# Patient Record
Sex: FEMALE | Race: WHITE | NOT HISPANIC OR LATINO | Employment: FULL TIME | URBAN - METROPOLITAN AREA
[De-identification: names, ages, dates, MRNs, and addresses within clinical notes are randomized per-mention and may not be internally consistent; named-entity substitution may affect disease eponyms.]

---

## 2023-05-18 DIAGNOSIS — E78.2 COMBINED HYPERLIPIDEMIA: Primary | ICD-10-CM

## 2023-05-18 PROBLEM — L27.0 ALLERGIC DRUG RASH: Status: ACTIVE | Noted: 2023-05-18

## 2023-05-18 PROBLEM — B00.1 RECURRENT COLD SORES: Status: ACTIVE | Noted: 2023-05-18

## 2023-05-18 PROBLEM — J32.9 CHRONIC SINUSITIS: Status: ACTIVE | Noted: 2023-05-18

## 2023-05-18 PROBLEM — L50.9 HIVES: Status: ACTIVE | Noted: 2023-05-18

## 2023-05-18 PROBLEM — I07.1 MILD TRICUSPID INSUFFICIENCY: Status: ACTIVE | Noted: 2023-05-18

## 2023-05-18 PROBLEM — I35.1 MILD AORTIC INSUFFICIENCY: Status: ACTIVE | Noted: 2023-05-18

## 2023-05-18 PROBLEM — R60.9 DEPENDENT EDEMA: Status: ACTIVE | Noted: 2023-05-18

## 2023-05-18 PROBLEM — R31.9 HEMATURIA: Status: ACTIVE | Noted: 2023-05-18

## 2023-05-18 PROBLEM — I34.0 MILD MITRAL REGURGITATION: Status: ACTIVE | Noted: 2023-05-18

## 2023-05-18 PROBLEM — M81.0 POSTMENOPAUSAL BONE LOSS: Status: ACTIVE | Noted: 2023-05-18

## 2023-05-18 PROBLEM — I51.9 MILD DIASTOLIC DYSFUNCTION: Status: ACTIVE | Noted: 2023-05-18

## 2023-05-18 PROBLEM — R21 FACIAL RASH: Status: ACTIVE | Noted: 2023-05-18

## 2023-05-18 PROBLEM — R29.818 SUSPECTED SLEEP APNEA: Status: ACTIVE | Noted: 2023-05-18

## 2023-05-18 PROBLEM — Z90.79 S/P TAH-BSO: Status: ACTIVE | Noted: 2023-05-18

## 2023-05-18 PROBLEM — I10 BENIGN ESSENTIAL HYPERTENSION: Status: ACTIVE | Noted: 2023-05-18

## 2023-05-18 PROBLEM — I48.0 PAROXYSMAL ATRIAL FIBRILLATION (MULTI): Status: ACTIVE | Noted: 2023-05-18

## 2023-05-18 PROBLEM — B00.9 HERPES SIMPLEX TYPE 1 INFECTION: Status: ACTIVE | Noted: 2023-05-18

## 2023-05-18 PROBLEM — R39.9 SYMPTOMS INVOLVING URINARY SYSTEM: Status: ACTIVE | Noted: 2023-05-18

## 2023-05-18 PROBLEM — R06.83 LOUD SNORING: Status: ACTIVE | Noted: 2023-05-18

## 2023-05-18 PROBLEM — U07.1 COVID-19 VIRUS INFECTION: Status: ACTIVE | Noted: 2023-05-18

## 2023-05-18 PROBLEM — H93.19 RINGING IN EARS: Status: ACTIVE | Noted: 2023-05-18

## 2023-05-18 PROBLEM — Z12.11 COLON CANCER SCREENING: Status: ACTIVE | Noted: 2023-05-18

## 2023-05-18 PROBLEM — L98.9 ARM SKIN LESION, RIGHT: Status: ACTIVE | Noted: 2023-05-18

## 2023-05-18 PROBLEM — Z90.722 S/P TAH-BSO: Status: ACTIVE | Noted: 2023-05-18

## 2023-05-18 PROBLEM — I47.10 PAROXYSMAL SUPRAVENTRICULAR TACHYCARDIA (CMS-HCC): Status: ACTIVE | Noted: 2023-05-18

## 2023-05-18 PROBLEM — M77.8 SHOULDER CAPSULITIS: Status: ACTIVE | Noted: 2023-05-18

## 2023-05-18 PROBLEM — G50.0 TRIGEMINAL NEURALGIA: Status: ACTIVE | Noted: 2023-05-18

## 2023-05-18 PROBLEM — Z90.710 S/P TAH-BSO: Status: ACTIVE | Noted: 2023-05-18

## 2023-05-18 PROBLEM — H10.9 CONJUNCTIVITIS: Status: ACTIVE | Noted: 2023-05-18

## 2023-05-18 LAB
ALANINE AMINOTRANSFERASE (SGPT) (U/L) IN SER/PLAS: 23 U/L (ref 7–45)
ALBUMIN (G/DL) IN SER/PLAS: 4.4 G/DL (ref 3.4–5)
ALKALINE PHOSPHATASE (U/L) IN SER/PLAS: 123 U/L (ref 33–136)
ANION GAP IN SER/PLAS: 10 MMOL/L (ref 10–20)
ASPARTATE AMINOTRANSFERASE (SGOT) (U/L) IN SER/PLAS: 21 U/L (ref 9–39)
BILIRUBIN TOTAL (MG/DL) IN SER/PLAS: 0.4 MG/DL (ref 0–1.2)
CALCIUM (MG/DL) IN SER/PLAS: 9.3 MG/DL (ref 8.6–10.3)
CARBON DIOXIDE, TOTAL (MMOL/L) IN SER/PLAS: 28 MMOL/L (ref 21–32)
CHLORIDE (MMOL/L) IN SER/PLAS: 105 MMOL/L (ref 98–107)
CHOLESTEROL (MG/DL) IN SER/PLAS: 231 MG/DL (ref 0–199)
CHOLESTEROL IN HDL (MG/DL) IN SER/PLAS: 60.6 MG/DL
CHOLESTEROL/HDL RATIO: 3.8
CREATININE (MG/DL) IN SER/PLAS: 0.75 MG/DL (ref 0.5–1.05)
ERYTHROCYTE DISTRIBUTION WIDTH (RATIO) BY AUTOMATED COUNT: 14.5 % (ref 11.5–14.5)
ERYTHROCYTE MEAN CORPUSCULAR HEMOGLOBIN CONCENTRATION (G/DL) BY AUTOMATED: 32.3 G/DL (ref 32–36)
ERYTHROCYTE MEAN CORPUSCULAR VOLUME (FL) BY AUTOMATED COUNT: 88 FL (ref 80–100)
ERYTHROCYTES (10*6/UL) IN BLOOD BY AUTOMATED COUNT: 5 X10E12/L (ref 4–5.2)
GFR FEMALE: 88 ML/MIN/1.73M2
GLUCOSE (MG/DL) IN SER/PLAS: 84 MG/DL (ref 74–99)
HEMATOCRIT (%) IN BLOOD BY AUTOMATED COUNT: 43.9 % (ref 36–46)
HEMOGLOBIN (G/DL) IN BLOOD: 14.2 G/DL (ref 12–16)
LDL: 151 MG/DL (ref 0–99)
LEUKOCYTES (10*3/UL) IN BLOOD BY AUTOMATED COUNT: 6 X10E9/L (ref 4.4–11.3)
PLATELETS (10*3/UL) IN BLOOD AUTOMATED COUNT: 334 X10E9/L (ref 150–450)
POTASSIUM (MMOL/L) IN SER/PLAS: 4.3 MMOL/L (ref 3.5–5.3)
PROTEIN TOTAL: 7.4 G/DL (ref 6.4–8.2)
SODIUM (MMOL/L) IN SER/PLAS: 139 MMOL/L (ref 136–145)
THYROTROPIN (MIU/L) IN SER/PLAS BY DETECTION LIMIT <= 0.05 MIU/L: 2.14 MIU/L (ref 0.44–3.98)
TRIGLYCERIDE (MG/DL) IN SER/PLAS: 98 MG/DL (ref 0–149)
UREA NITROGEN (MG/DL) IN SER/PLAS: 11 MG/DL (ref 6–23)
VLDL: 20 MG/DL (ref 0–40)

## 2023-05-18 RX ORDER — EZETIMIBE 10 MG/1
10 TABLET ORAL DAILY
COMMUNITY
Start: 2023-04-18 | End: 2023-05-18 | Stop reason: SDUPTHER

## 2023-05-18 RX ORDER — DILTIAZEM HYDROCHLORIDE 240 MG/1
240 CAPSULE, EXTENDED RELEASE ORAL DAILY
COMMUNITY
End: 2023-09-28 | Stop reason: SDUPTHER

## 2023-05-18 RX ORDER — EZETIMIBE 10 MG/1
10 TABLET ORAL DAILY
Qty: 90 TABLET | Refills: 2 | Status: SHIPPED | OUTPATIENT
Start: 2023-05-18 | End: 2024-01-05 | Stop reason: SDUPTHER

## 2023-05-18 RX ORDER — APIXABAN 5 MG/1
1 TABLET, FILM COATED ORAL 2 TIMES DAILY
COMMUNITY
Start: 2021-03-02 | End: 2023-11-09 | Stop reason: SDUPTHER

## 2023-05-18 RX ORDER — LORATADINE 10 MG/1
10 TABLET ORAL DAILY
COMMUNITY

## 2023-05-18 RX ORDER — ERGOCALCIFEROL (VITAMIN D2) 50 MCG
2000 CAPSULE ORAL DAILY
COMMUNITY
End: 2023-11-09 | Stop reason: ALTCHOICE

## 2023-05-18 RX ORDER — LUTEIN 10 MG
1 TABLET ORAL DAILY
COMMUNITY
End: 2023-07-12 | Stop reason: ALTCHOICE

## 2023-05-18 RX ORDER — PROPAFENONE HYDROCHLORIDE 150 MG/1
150 TABLET, COATED ORAL 2 TIMES DAILY
COMMUNITY
End: 2023-11-09 | Stop reason: SDUPTHER

## 2023-07-11 NOTE — PROGRESS NOTES
Subjective   Reason for Visit: Luba Coates is an 67 y.o. female here for her Welcome to Medicare Assessment          She has an appt with Dr Buchanan 7/13/2023.  She saw Dr Grady in 2/2023. She thinks Dr Grady is fabulous.   She wishes she had been seeing Dr Grady since day 1.     She has had a terrible time getting the CPAP situated.  She stopped dealing with the CPAP. She has done 8 days with the CPAP.   She barely slept for 4 days when she first started the CPAP     She is moving her mother to assisted living. Her mother is 91 y/o.   She has to sell her mothers house. She went to her mothers house and there was water leaking.  They turned the water off and her  fixed it but the water would not come back on   Her mother has had multiple medical situations this year 2023.   Her mother's cognitive ability has decreased greatly with all her medical issues.      She admits she eats a lot of cheese.       HEALTH:   PAP fine 2013 and HPV - , 5/17 -, D/C 4/19 was + polyp -.. TRH/BSO in 6/19   Vaginal exam 9/15 , 5/17, 5/18   Mammo 7/17 -, 6/18, missed 2019, 7/2020, 2/2022, 2/2023  BD 6/18 T-1.7, 2/2023 T-2.5  Colon 7/17 and Q 10   Ca+ cardiac score 6/18 was Zero  EKG 2010, 9/15 , 4/17, 4/19, 2/2020, 10/2022, 7/2023  Urine 5/18,  Flu 10/2022  Hep C 6/18 -   TDAP 6/5/2020  Prevnar 20 recommended and will return   Pneumovax never  Shingrix 6/18/2020 and 9/9/2020   Pfizer CVD vaccine 3/4/2021 and 3/25/2021 booster 11/19/2021, 10/18/2022  Ophth Her last eye exam was in 1/2022 and sees yearly. She has early cataracts OD, no glaucoma or MD.      Patient Care Team:  Deepika Edgar MD as PCP - General     Review of Systems  All systems negative except those listed in the HPI      Past Medical, Surgical, and Family History reviewed and updated in chart.  Reviewed all medications by prescribing practitioner or clinical pharmacist   (such as prescriptions, OTCs, herbal therapies and supplements) and documented in the  medical record      Objective   Vitals:  Visit Vitals  /70   Pulse 77   Temp 36.1 °C (97 °F) (Skin)    Body mass index is 33.78 kg/m².      Physical Exam  Vitals reviewed.   Constitutional:       Appearance: Normal appearance. She is obese.   HENT:      Head: Normocephalic.      Right Ear: Tympanic membrane, ear canal and external ear normal.      Left Ear: Tympanic membrane, ear canal and external ear normal.      Nose: Nose normal.      Mouth/Throat:      Pharynx: Oropharynx is clear.   Eyes:      Conjunctiva/sclera: Conjunctivae normal.   Cardiovascular:      Rate and Rhythm: Normal rate and regular rhythm.      Pulses: Normal pulses.      Heart sounds: Normal heart sounds.      Comments: she is not in a- fib   Pulmonary:      Effort: Pulmonary effort is normal.      Breath sounds: Normal breath sounds.   Abdominal:      General: Bowel sounds are normal.      Palpations: Abdomen is soft.   Musculoskeletal:         General: Normal range of motion.      Cervical back: Normal range of motion and neck supple.   Skin:     General: Skin is warm.   Neurological:      General: No focal deficit present.      Mental Status: She is alert and oriented to person, place, and time.   Psychiatric:         Mood and Affect: Mood normal.         Behavior: Behavior normal.         Thought Content: Thought content normal.         Judgment: Judgment normal.       Assessment/Plan   Problem List Items Addressed This Visit    None    Welcome to Medicare Assessment completed  Reviewed her labs from 5/2023     Medicare Wellness completed  -  Discussed healthy diet and regular exercise.    -  Physical exam overall unremarkable. Immunizations reviewed and updated accordingly. Healthy lifestyle choices discussed (tobacco avoidance, appropriate alcohol use, avoidance of illicit substances).   -  Patient is wearing seatbelt.   -  Screening lab work ordered as indicated.    -  Age appropriate screening tests reviewed with patient.       We  spent 15 minutes discussing depression screen and there is nothing found that is of concern for underling depression. The PQH form was filled and the meds reviewed.   No depression to report     We spent 15 minutes discussing alcohol use and there are no concerns about overuse. The 15 min was spent in going over any issues of use of alcohol. Socially only     She has grab bars in the shower.  She has not fallen recently and no risk of falls in the house   She has good lighting around the house and functioning smoke detectors.      Her weight in office last visit was 203 with BMI of 33.78. We spent 15 minutes discussing diet and weight loss. The struggle of weight loss persists   Discussed importance of getting her BMI as close to 25 as possible.   She needs to focus on her diet to help control her lipid levels.     A-fib diagnosed 2/5/2020: she is not in a- fib 7/2023. She has intermittent episodes of a- fib. EtOH exacerbates her a- fib. Warned patient of a- fib with EtOH use.   She woke up sick on 12/30/2022, she had a-fib for 5 hours.   She does not like the fact that the a- fib is this prominent.   Continue propafenone 150 mg BID prn for breakthrough palpitations   Continue Eliquis 5 mg BID, HCTZ 12.5 mg daily and diltiazem 240 mg daily   Stress test, ECHO, and testing was normal no valve disease or clot 2/2020.   Holter monitor in 2/2020, she was in normal sinus rhythm, there was no atrial fibrillation. No significant tachycardia.   She saw Dr Roberts in 12/2021   She saw Dr Grady in 2/2023.   She has an appt with Dr Buchanan 7/13/2023.    HTN : Stable   She was experiencing heart palpitations due to increased stress   Bystolic helped but she had diarrhea with it so we weaned her off.  Palpitations were related to alcohol intake as well  EKG was normal 7/2023, no LVH or strain pattern noted   Continue diltiazem 240 mg daily and propafenone 150 mg BID. She takes an extra propafenone if she is in a-fib  Recommend  she monitor her BP at home and call with elevated readings   She saw Dr Becker in 10/2022    I have spent 15 min face to face with this patient discussing their cardiac risk and behavioral therapies of nutrition choices and exercise. We are trying to eliminate habits that are contributing to their cardiac risk.  We agreed on a plan of how they can reduce their current CV risk   The patient's  10 yr CV risk was estimated at  6.6 % 7/2023     HLD: Uncontrolled.  and HDL 60 on labs in 5/2023. She has to commit to diet and exercise. Offered patient referral to dietician, she declines stating she knows what she needs to do.   Explained goal for LDL to be less than 100 and ideally less than 70    She admits she eats a lot of cheese. Recommend she limit dairy intake   Continue Zetia 20 mg daily   Discontinued simvastatin due to LE weakness   Discontinued Crestor due to aching and nightmares  She stopped pravastatin due to developing a rash.  She failed Livalo due to cost     Ca cardiac score in 10/2021 showed score of ZERO, unchanged 1.6 cm low density left hepatic lobe lesion may represent a cyst but suboptimally evaluated without contrast   She needs to be committed to diet and exercise     BLE :  Stable.   She is taking a diuretic prn   Recommend she begin wearing support stockings daily   Elevate legs 45 minutes at night     BPPV: Stable   When her ears get congested she has vertigo Sx   Continue Epley maneuver prn     Mild situational depression: She is moving her mother to assisted living. She has to sell her mothers house. Her mother is 91 y/o. Her mother has had multiple medical situations this year 2023. Her mother's cognitive ability has decreased greatly with all her medical issues.   Recommend she speak with a therapist. She declines.   She has no family around to help her with her mother.   She has increased stressors with her mother since she is taking care of her.  She has a friend that relies on her  quite a bit    She takes care of her  with his health issues with Myasthenia Gravis.  She is mentally and physically exhausted from taking care of others  Her father passed away in 11/18.    Explained importance of her taking breaks for herself to help her cope   She failed Lexapro due to nausea and fatigue and weaned off.  She declines medication     Intermittent left knee pain: she has no complaints of this today 7/2023   She had improvement in this condition with the chiropractor   She will do the tennis ball stretches. She will keep up the exercise   She is flat footed and needs to wear orthotics in her shoes  She is not to walk bare footed     She saw dermatology in 2020 and no issues to report     Cold sore inner left lip due to increased stress and history.  She has Valtrex 1GM on hand for outbreaks   She can use OTC Abreva.    GAVIOTA: She has had a terrible time getting the CPAP situated. She stopped dealing with the CPAP. She has done 8 days with the CPAP.  Recommend she talk with her dentist about an appliance she could use instead of the CPAP.   She is not interested in Inspire.   Symptoms include snoring, night time awakenings, nocturia.   PSG 4/12/23 --> mild GAVIOTA, RDI 3%13.4, RDI 4% 8, OLVIN 0; SpO2 karlee 86%.   She saw Dr Stiles in 6/2023 and using CPAP      Vitamin D def:  Continue OTC Vitamin D 2000 UT daily     She had GARCIA and BSO with Dr Killian in 6/19. Bx was precancerous.   She does not need any HRT.   Bladder has mild anterior prolapse and continue Kegels   Mammo was normal in 2/2023. Breast exam normal 7/2023     BD in 2/2023 T-2.5. Her mother has OP and started having fractures in her late 50's Her mother is doing Prolia injections.   Discussed systemic medications to treat OP along with possible side effects.   Recommend she see her dentist to make sure she does not need any work done prior to beginning OP medications. She saw her dentist in 4/2023. She will discuss beginning Fosamax with  her dentist   Recommend OTC Calcium 500 mg BID, OTC Vitamin D 2000 UT daily and eat 2 servings of calcium enriched foods daily. Discussed importance of weight bearing exercises     Colonoscopy in 7/17 and normal and Q 10     Ophth:  Her last eye exam was in 1/2022 and sees yearly.   She has early cataracts OD, no glaucoma or MD.   She will have her next eye exam faxed to my office in order to update her medical records.    I spent 15 min with the patient discussing their wishes for end of life choices.   We discussed the need for a Living Will and that wishes should be discussed with Family. The DNR status was reviewed, and we discussed the options of this and, the DNR _CC options as well.   We also went over how important it was to have these choices written down and clear for any surviving family so that their wishes are followed   The patient and I came to to following agreement : She has a LW and MPOA.  Recommend she bring a copy of her LW and MPOA in office to have scanned in her chart 7/2023     Flu 10/18/2022  Hep C 6/18 -   TDAP 6/5/2020  Prevnar 20 recommended and will return    Pneumovax never  Shingrix 6/18/2020 and 9/9/2020   Pfizer CVD vaccine 3/4/2021 and 3/25/2021 booster 11/19/2021, 10/18/2022    Some elements in the chart were copied from Dr. Edgar's last office visit with patient. Notes have been updated where appropriate, and reflect my current medical decision making from today.     RTC in 6 months for follow up or sooner if needed - will do Prevnar 20 and influenza vaccine     Scribe Attestation  By signing my name below, I, Hyun Friedman , Scribe   attest that this documentation has been prepared under the direction and in the presence of Deepika Edgar MD.

## 2023-07-12 ENCOUNTER — OFFICE VISIT (OUTPATIENT)
Dept: PRIMARY CARE | Facility: CLINIC | Age: 68
End: 2023-07-12
Payer: MEDICARE

## 2023-07-12 VITALS
OXYGEN SATURATION: 97 % | SYSTOLIC BLOOD PRESSURE: 118 MMHG | TEMPERATURE: 97 F | WEIGHT: 203 LBS | HEIGHT: 65 IN | DIASTOLIC BLOOD PRESSURE: 70 MMHG | HEART RATE: 77 BPM | BODY MASS INDEX: 33.82 KG/M2

## 2023-07-12 DIAGNOSIS — M81.0 AGE-RELATED OSTEOPOROSIS WITHOUT CURRENT PATHOLOGICAL FRACTURE: ICD-10-CM

## 2023-07-12 DIAGNOSIS — G50.0 TRIGEMINAL NEURALGIA: ICD-10-CM

## 2023-07-12 DIAGNOSIS — I48.0 PAROXYSMAL ATRIAL FIBRILLATION (MULTI): ICD-10-CM

## 2023-07-12 DIAGNOSIS — I10 BENIGN ESSENTIAL HYPERTENSION: ICD-10-CM

## 2023-07-12 DIAGNOSIS — I35.1 MILD AORTIC INSUFFICIENCY: ICD-10-CM

## 2023-07-12 DIAGNOSIS — E78.2 COMBINED HYPERLIPIDEMIA: ICD-10-CM

## 2023-07-12 DIAGNOSIS — Z00.00 HEALTHCARE MAINTENANCE: Primary | ICD-10-CM

## 2023-07-12 DIAGNOSIS — G47.33 OBSTRUCTIVE SLEEP APNEA SYNDROME: ICD-10-CM

## 2023-07-12 PROBLEM — E78.5 DYSLIPIDEMIA: Status: ACTIVE | Noted: 2023-07-12

## 2023-07-12 PROBLEM — G47.30 APNEA, SLEEP: Status: ACTIVE | Noted: 2023-07-12

## 2023-07-12 PROBLEM — R21 FACIAL RASH: Status: RESOLVED | Noted: 2023-05-18 | Resolved: 2023-07-12

## 2023-07-12 PROBLEM — R29.818 SUSPECTED SLEEP APNEA: Status: RESOLVED | Noted: 2023-05-18 | Resolved: 2023-07-12

## 2023-07-12 PROBLEM — H10.9 CONJUNCTIVITIS: Status: RESOLVED | Noted: 2023-05-18 | Resolved: 2023-07-12

## 2023-07-12 PROBLEM — I51.89 DIASTOLIC DYSFUNCTION: Status: ACTIVE | Noted: 2023-07-12

## 2023-07-12 PROBLEM — R39.9 SYMPTOMS INVOLVING URINARY SYSTEM: Status: RESOLVED | Noted: 2023-05-18 | Resolved: 2023-07-12

## 2023-07-12 PROBLEM — G47.30 SLEEP DISORDER BREATHING: Status: ACTIVE | Noted: 2023-07-12

## 2023-07-12 PROBLEM — L27.0 ALLERGIC DRUG RASH: Status: RESOLVED | Noted: 2023-05-18 | Resolved: 2023-07-12

## 2023-07-12 PROBLEM — G47.20 DISTURBED SLEEP RHYTHM: Status: ACTIVE | Noted: 2023-07-12

## 2023-07-12 PROBLEM — E78.5 DYSLIPIDEMIA: Status: RESOLVED | Noted: 2023-07-12 | Resolved: 2023-07-12

## 2023-07-12 PROCEDURE — 1036F TOBACCO NON-USER: CPT | Performed by: INTERNAL MEDICINE

## 2023-07-12 PROCEDURE — 1126F AMNT PAIN NOTED NONE PRSNT: CPT | Performed by: INTERNAL MEDICINE

## 2023-07-12 PROCEDURE — 1159F MED LIST DOCD IN RCRD: CPT | Performed by: INTERNAL MEDICINE

## 2023-07-12 PROCEDURE — 1170F FXNL STATUS ASSESSED: CPT | Performed by: INTERNAL MEDICINE

## 2023-07-12 PROCEDURE — 3078F DIAST BP <80 MM HG: CPT | Performed by: INTERNAL MEDICINE

## 2023-07-12 PROCEDURE — 3008F BODY MASS INDEX DOCD: CPT | Performed by: INTERNAL MEDICINE

## 2023-07-12 PROCEDURE — 3074F SYST BP LT 130 MM HG: CPT | Performed by: INTERNAL MEDICINE

## 2023-07-12 PROCEDURE — G0402 INITIAL PREVENTIVE EXAM: HCPCS | Performed by: INTERNAL MEDICINE

## 2023-07-12 PROCEDURE — G0446 INTENS BEHAVE THER CARDIO DX: HCPCS | Performed by: INTERNAL MEDICINE

## 2023-07-12 PROCEDURE — G0403 EKG FOR INITIAL PREVENT EXAM: HCPCS | Performed by: INTERNAL MEDICINE

## 2023-07-12 PROCEDURE — 1160F RVW MEDS BY RX/DR IN RCRD: CPT | Performed by: INTERNAL MEDICINE

## 2023-07-12 RX ORDER — HYDROXYZINE HYDROCHLORIDE 25 MG/1
25 TABLET, FILM COATED ORAL NIGHTLY
COMMUNITY
Start: 2023-06-28 | End: 2023-11-09 | Stop reason: ALTCHOICE

## 2023-07-12 RX ORDER — CLOBETASOL PROPIONATE 0.5 MG/G
1 CREAM TOPICAL 2 TIMES DAILY
COMMUNITY
Start: 2023-06-28 | End: 2023-11-09 | Stop reason: ALTCHOICE

## 2023-07-12 RX ORDER — ALENDRONATE SODIUM 70 MG/1
70 TABLET ORAL
Qty: 12 TABLET | Refills: 3 | Status: SHIPPED | OUTPATIENT
Start: 2023-07-12 | End: 2024-01-05 | Stop reason: SDUPTHER

## 2023-07-12 ASSESSMENT — PATIENT HEALTH QUESTIONNAIRE - PHQ9
1. LITTLE INTEREST OR PLEASURE IN DOING THINGS: NOT AT ALL
SUM OF ALL RESPONSES TO PHQ9 QUESTIONS 1 AND 2: 0
2. FEELING DOWN, DEPRESSED OR HOPELESS: NOT AT ALL

## 2023-07-12 ASSESSMENT — VISUAL ACUITY
OS_CC: 20/25
OD_CC: 20/20

## 2023-07-12 ASSESSMENT — ENCOUNTER SYMPTOMS
DEPRESSION: 0
LOSS OF SENSATION IN FEET: 0
OCCASIONAL FEELINGS OF UNSTEADINESS: 0

## 2023-07-12 ASSESSMENT — ACTIVITIES OF DAILY LIVING (ADL)
DOING_HOUSEWORK: INDEPENDENT
BATHING: INDEPENDENT
GROCERY_SHOPPING: INDEPENDENT
DRESSING: INDEPENDENT
TAKING_MEDICATION: INDEPENDENT
MANAGING_FINANCES: INDEPENDENT

## 2023-09-28 DIAGNOSIS — I10 BENIGN ESSENTIAL HYPERTENSION: Primary | ICD-10-CM

## 2023-09-28 RX ORDER — DILTIAZEM HYDROCHLORIDE 240 MG/1
240 CAPSULE, EXTENDED RELEASE ORAL DAILY
Qty: 30 CAPSULE | Refills: 0 | Status: SHIPPED | OUTPATIENT
Start: 2023-09-28 | End: 2023-10-20 | Stop reason: SDUPTHER

## 2023-10-20 DIAGNOSIS — I10 BENIGN ESSENTIAL HYPERTENSION: ICD-10-CM

## 2023-10-20 RX ORDER — DILTIAZEM HYDROCHLORIDE 240 MG/1
240 CAPSULE, EXTENDED RELEASE ORAL DAILY
Qty: 90 CAPSULE | Refills: 1 | Status: SHIPPED | OUTPATIENT
Start: 2023-10-20 | End: 2023-11-01 | Stop reason: SDUPTHER

## 2023-11-01 DIAGNOSIS — I10 BENIGN ESSENTIAL HYPERTENSION: ICD-10-CM

## 2023-11-01 RX ORDER — DILTIAZEM HYDROCHLORIDE 240 MG/1
240 CAPSULE, EXTENDED RELEASE ORAL DAILY
Qty: 30 CAPSULE | Refills: 11 | Status: SHIPPED | OUTPATIENT
Start: 2023-11-01 | End: 2023-11-09 | Stop reason: SDUPTHER

## 2023-11-08 PROBLEM — I51.89 DIASTOLIC DYSFUNCTION: Status: RESOLVED | Noted: 2023-07-12 | Resolved: 2023-11-08

## 2023-11-08 RX ORDER — PROPAFENONE HYDROCHLORIDE 225 MG/1
1 TABLET, COATED ORAL 2 TIMES DAILY
COMMUNITY
Start: 2023-07-13 | End: 2023-11-09 | Stop reason: SDUPTHER

## 2023-11-09 ENCOUNTER — OFFICE VISIT (OUTPATIENT)
Dept: CARDIOLOGY | Facility: CLINIC | Age: 68
End: 2023-11-09
Payer: MEDICARE

## 2023-11-09 VITALS
WEIGHT: 209 LBS | SYSTOLIC BLOOD PRESSURE: 140 MMHG | HEIGHT: 65 IN | BODY MASS INDEX: 34.82 KG/M2 | OXYGEN SATURATION: 97 % | DIASTOLIC BLOOD PRESSURE: 78 MMHG | HEART RATE: 73 BPM

## 2023-11-09 DIAGNOSIS — I10 BENIGN ESSENTIAL HYPERTENSION: ICD-10-CM

## 2023-11-09 DIAGNOSIS — I48.0 PAROXYSMAL ATRIAL FIBRILLATION (MULTI): Primary | ICD-10-CM

## 2023-11-09 PROCEDURE — 93000 ELECTROCARDIOGRAM COMPLETE: CPT | Performed by: STUDENT IN AN ORGANIZED HEALTH CARE EDUCATION/TRAINING PROGRAM

## 2023-11-09 PROCEDURE — 3008F BODY MASS INDEX DOCD: CPT | Performed by: STUDENT IN AN ORGANIZED HEALTH CARE EDUCATION/TRAINING PROGRAM

## 2023-11-09 PROCEDURE — 1036F TOBACCO NON-USER: CPT | Performed by: STUDENT IN AN ORGANIZED HEALTH CARE EDUCATION/TRAINING PROGRAM

## 2023-11-09 PROCEDURE — 1159F MED LIST DOCD IN RCRD: CPT | Performed by: STUDENT IN AN ORGANIZED HEALTH CARE EDUCATION/TRAINING PROGRAM

## 2023-11-09 PROCEDURE — 3077F SYST BP >= 140 MM HG: CPT | Performed by: STUDENT IN AN ORGANIZED HEALTH CARE EDUCATION/TRAINING PROGRAM

## 2023-11-09 PROCEDURE — 1160F RVW MEDS BY RX/DR IN RCRD: CPT | Performed by: STUDENT IN AN ORGANIZED HEALTH CARE EDUCATION/TRAINING PROGRAM

## 2023-11-09 PROCEDURE — 99214 OFFICE O/P EST MOD 30 MIN: CPT | Performed by: STUDENT IN AN ORGANIZED HEALTH CARE EDUCATION/TRAINING PROGRAM

## 2023-11-09 PROCEDURE — 3078F DIAST BP <80 MM HG: CPT | Performed by: STUDENT IN AN ORGANIZED HEALTH CARE EDUCATION/TRAINING PROGRAM

## 2023-11-09 PROCEDURE — 1126F AMNT PAIN NOTED NONE PRSNT: CPT | Performed by: STUDENT IN AN ORGANIZED HEALTH CARE EDUCATION/TRAINING PROGRAM

## 2023-11-09 RX ORDER — PROPAFENONE HYDROCHLORIDE 225 MG/1
225 TABLET, COATED ORAL 2 TIMES DAILY
Qty: 60 TABLET | Refills: 11 | Status: SHIPPED | OUTPATIENT
Start: 2023-11-09 | End: 2024-01-05 | Stop reason: SDUPTHER

## 2023-11-09 RX ORDER — APIXABAN 5 MG/1
5 TABLET, FILM COATED ORAL 2 TIMES DAILY
Qty: 60 TABLET | Refills: 11 | Status: SHIPPED | OUTPATIENT
Start: 2023-11-09 | End: 2024-01-05 | Stop reason: SDUPTHER

## 2023-11-09 RX ORDER — DILTIAZEM HYDROCHLORIDE 240 MG/1
240 CAPSULE, EXTENDED RELEASE ORAL DAILY
Qty: 30 CAPSULE | Refills: 11 | Status: SHIPPED | OUTPATIENT
Start: 2023-11-09 | End: 2024-01-05 | Stop reason: SDUPTHER

## 2023-11-09 NOTE — PROGRESS NOTES
"Cardiac Electrophysiology Office Visit    Referred by Kiko Lugo MD for   Chief Complaint   Patient presents with    Atrial Fibrillation        HPI:  Luba Coates is a 67 y.o. year old female patient with h/o paroxysmal atrial fibrillation (on Eliquis and propafenone, diltiazem), HLD (multiple statins tried but not able to tolerate due to myalgias) presenting today for follow-up    Pt did increase her Propafenone. She has some contact dermatitis and was on antihistamine. Most recent more episodes of AF. Aug 8th AF lasting 2 hours.    PMHx/PSHx: As above  Tobacco never, alcohol - social, caffeine use 1 cup/day, drug use - denies  FamHx: Father - AF in 70s, Mother - AF in her 90s,     Objective  Allergies   Allergen Reactions    Nebivolol Other     Leg cramps    Statins-Hmg-Coa Reductase Inhibitors Myalgia    Tramadol Anxiety, Psychosis and Nausea/vomiting     hallucinations    Escitalopram Oxalate Nausea/vomiting        Current Outpatient Medications   Medication Instructions    alendronate (FOSAMAX) 70 mg, oral, Every 7 days, Take in the morning with a full glass of water, on an empty stomach, and do not take anything else by mouth or lie down for the next 30 min.    Eliquis 5 mg, oral, 2 times daily    ezetimibe (ZETIA) 10 mg, oral, Daily    loratadine (CLARITIN) 10 mg, oral, Daily    propafenone (RYTHMOL) 225 mg, oral, 2 times daily    Tiadylt  mg, oral, Daily         Visit Vitals  /78 (BP Location: Left arm, Patient Position: Sitting)   Pulse 73   Ht 1.651 m (5' 5\")   Wt 94.8 kg (209 lb)   SpO2 97%   BMI 34.78 kg/m²   Smoking Status Never   BSA 2.09 m²        Physical Exam  Vitals reviewed.   Constitutional:       Appearance: Normal appearance.   HENT:      Head: Normocephalic.   Cardiovascular:      Rate and Rhythm: Normal rate and regular rhythm.   Pulmonary:      Effort: Pulmonary effort is normal. No respiratory distress.      Breath sounds: No wheezing.   Skin:     General: Skin is " warm and dry.      Capillary Refill: Capillary refill takes less than 2 seconds.   Neurological:      Mental Status: She is alert.   Psychiatric:         Mood and Affect: Mood normal.        My Interpretation of Reviewed Study(s):  Coronary calcium score 10/7/2021: Total score of 0. Ascending aorta normal dimension.  Echo (Feb 2020): EF 50 to 55%. Mild concentric LVH. Grade 2 diastolic dysfunction. Mild AR, MR, and TR. RVSP 34 mmHg.  Stress echo (Feb 2020): EF at rest 55% increasing to 65% postexercise. No evidence of ischemia.  6-day cardiac monitor (Feb 2020): Sinus rhythm with PACs and PVCs along with 3 episodes of atrial tachycardia with the longest and fastest episode of 4 beats at 151 bpm.     Assessment/Plan   #Paroxysmal atrial fibrillation  -AF Dx History: 72 2020 with symptoms of heart racing  -h/o Cardioversion: No  -AAD Use:  Propafenone 150 mg twice daily  -Anticoagulation use: Apixaban 5mg BID (current)  - h/o Ablation: None  -[id number]: 3  -c/w AC: Apixaban 5mg BID  -We had a very detailed discussion about rhythm control. Options of alternative antiarrhythmics are reasonable versus ablative strategy. Patient initially suggested that she would like to wait until she has A-fib more frequently before considering ablation and I counseled that with increasing A-fib frequency it is likely that she will have more A-fib and therefore the efficacy of medications and procedures will go down. Patient is very adverse to an invasive procedure and therefore would like to pursue medication at this time. We discussed our options including increasing dose of propafenone, sotalol/Tikosyn.   -Given patient's relatively young age I am very hesitant and do not recommend amiodarone. We did discuss the side effects and concerns with sotalol and Tikosyn and terms of antiarrhythmic and proarrhythmic effects and therefore patient would like to try an increased dose of propafenone first.  - c/w Propafenone to 225mg BID,  and  Diltiazem ER 240mg Daily  -Although based on patient's symptoms there is seems to be an increasing A-fib episode frequency patient still is very hesitant about proceeding with an ablative strategy or advancing to alternative antiarrhythmic and therefore we will continue with current medication regimen    Return to Clinic: Patient should return to the EP Clinic in 3 months     Kiko Buchanan MD [facility]  Cardiac Electrophysiology  [email or web address]    **Disclaimer: This note was dictated by speech recognition, and every effort has been made to prevent any error in transcription, however minor errors may be present**

## 2023-11-13 NOTE — PROGRESS NOTES
Subjective   Patient ID: Luba Coates is a 67 y.o. female who presents for her 4 month follow up multiple medical conditions       She was having difficulty adjusting to the CPAP so she turned it back in and will retry in 2/2024  She states she was never fitted for the CPAP.   She admits she was dealing with her mom getting her into assisted living and she was not sleeping  The cardiologist was not concerned she was not wearing the CPAP.    She was out with friends in 9/2023 and was around insects  She received a few bug bites so she started using creams and taking antihistamine   She took Medrol Dose Daniel due to not healing   She was in a- fib the entire time she was on vacation   She went back on Cardizem and since being on the 3 medications she has had 2 episodes of a- fib  She has seen Dr Buchanan and she told him the above situation   The rash has resolved and she is using moisturizing creams to keep it at bay    She started Fosamax after getting clearance from the dentist. She has been on Fosamax for 3 weeks    She would like the influenza and Prevnar 20 vaccine while in office today     She has been moving more and she feels better     She moved her mother to assisted living. Her mother is 89 y/o.   Her mother likes the new place and the patient sold her mothers house     She stopped taking OTC calcium due to having constipation        HEALTH:   PAP fine 2013 and HPV - , 5/17 -, D/C 4/19 + polyp -.. TRH/BSO in 6/19   Mammo 7/17 -, 6/18, 7/2020, 2/2022, 2/2023  BD 6/18 T-1.7, 2/2023 T-2.5 and started fosamax in 10/2023  Colon 7/17 and Q 10   Ca+ cardiac score 6/18 was Zero  EKG 2010, 9/15 , 4/17, 4/19, 2/2020, 10/2022, 7/2023, 11/2023 cardio  Urine 5/18,  Hep C 6/18 -    Flu 10/2022, 11/2023   TDAP 6/5/2020  Arexvy will hold off this year 2023  Prevnar 20 11/14/2023  Pneumovax never  Shingrix 6/18/2020 and 9/9/2020   evOLED CVD vaccine 3/4/2021 and 3/25/2021 booster 11/19/2021, 10/18/2022  Ophth Her last eye  exam was in 1/2022. She has early cataracts OD, no glaucoma or MD.       Review of Systems  All systems negative except those listed in the HPI      Objective   Visit Vitals  /70 (BP Location: Left arm, Patient Position: Sitting)   Pulse 69   Temp 36.1 °C (97 °F)    Body mass index is 33.95 kg/m².     Physical Exam  Vitals reviewed.   Constitutional:       Appearance: Normal appearance. She is obese.   HENT:      Head: Normocephalic.      Right Ear: Tympanic membrane, ear canal and external ear normal.      Left Ear: Tympanic membrane, ear canal and external ear normal.      Nose: Nose normal.      Mouth/Throat:      Pharynx: Oropharynx is clear.   Eyes:      Conjunctiva/sclera: Conjunctivae normal.   Cardiovascular:      Rate and Rhythm: Normal rate and regular rhythm.      Pulses: Normal pulses.      Heart sounds: Normal heart sounds.      Comments: sinus  Pulmonary:      Effort: Pulmonary effort is normal.      Breath sounds: Normal breath sounds.   Abdominal:      General: Bowel sounds are normal.      Palpations: Abdomen is soft.   Musculoskeletal:         General: Normal range of motion.      Cervical back: Normal range of motion and neck supple.   Skin:     General: Skin is warm.   Neurological:      General: No focal deficit present.      Mental Status: She is alert and oriented to person, place, and time.   Psychiatric:         Mood and Affect: Mood normal.         Behavior: Behavior normal.         Thought Content: Thought content normal.         Judgment: Judgment normal.       Assessment/Plan   Problem List Items Addressed This Visit       Benign essential hypertension - Primary    Combined hyperlipidemia    Dependent edema    GAVIOTA (obstructive sleep apnea)    Paroxysmal atrial fibrillation (CMS/HCC)    Paroxysmal supraventricular tachycardia    Postmenopausal bone loss    Trigeminal neuralgia       Follow up completed  Reviewed her labs from 5/2023    Her weight in office last visit was 204 with  BMI of 33.95. We spent 15 minutes discussing diet and weight loss. The struggle of weight loss persists   Discussed importance of getting her BMI as close to 25 as possible.   She needs to focus on her diet to help control her lipid levels.   She has been moving more and she feels better      A-fib diagnosed 2/5/2020: she is not in a- fib 11/2023. She records every episode and tries to identify the triggers   She woke up sick on 12/30/2022, she had a-fib for 5 hours.   She does not like the fact that the a- fib is this prominent.   Continue propafenone 225 mg BID prn for breakthrough palpitations   Continue Eliquis 5 mg BID, HCTZ 12.5 mg daily and diltiazem 240 mg daily   Stress test, ECHO, and testing was normal no valve disease or clot 2/2020.   Holter monitor in 2/2020, she was in normal sinus rhythm, there was no atrial fibrillation. No significant tachycardia.   She saw Dr Roberts in 12/2021   She saw Dr Grady in 2/2023.   She saw Dr Buchanan in 11/2023 and will see her back in 3 months      HTN : Stable   She was experiencing heart palpitations due to increased stress   Bystolic helped but she had diarrhea with it so we weaned her off.  Palpitations were related to alcohol intake as well  EKG sinus rhythm, HR 70 bpm 11/2023, no LVH or strain pattern noted   Continue diltiazem 240 mg daily and propafenone 225 mg BID.   She takes an extra propafenone if she is in a-fib  Recommend she monitor her BP at home and call with elevated readings   She saw Dr Buchanan in 11/2023 and will see her back in 3 months      I have spent 15 min face to face with this patient discussing their cardiac risk and behavioral therapies of nutrition choices and exercise. We are trying to eliminate habits that are contributing to their cardiac risk.  We agreed on a plan of how they can reduce their current CV risk   The patient's  10 yr CV risk was estimated at  6.6 % 11/2023      HLD: Uncontrolled.  and HDL 60 on labs in 5/2023.    Explained goal for LDL to be less than 100 and ideally less than 70    She admits she eats a lot of cheese. Recommend she limit dairy intake   Continue Zetia 20 mg daily   Discontinued simvastatin due to LE weakness   Discontinued Crestor due to aching and nightmares  She stopped pravastatin due to developing a rash.  She failed Livalo due to cost     Ca cardiac score in 10/2021 showed score of ZERO, unchanged 1.6 cm low density left hepatic lobe lesion may represent a cyst but suboptimally evaluated without contrast   She has to commit to diet and exercise. Offered patient referral to dietician, she declines stating she knows what she needs to do.     BPPV: Stable   When her ears get congested she has vertigo Sx   Continue Epley maneuver prn      BLE :  Stable.   She is taking a diuretic prn   Recommend she begin wearing support stockings daily   Elevate legs 45 minutes at night      Mild situational depression: She moved her mother to assisted living. Her mother is 89 y/o. Her mother likes the new place and the patient sold her mothers house   Her mother has had multiple medical situations this year 2023.   Her mother's cognitive ability has decreased greatly with all her medical issues.   She has no family around to help her with her mother.   She has a friend that relies on her quite a bit    She takes care of her  with his health issues with Myasthenia Gravis.  She is mentally and physically exhausted from taking care of others  Her father passed away in 11/18.    Recommend she speak with a therapist. She declines.   Explained importance of her taking breaks for herself to help her cope   She failed Lexapro due to nausea and fatigue and weaned off.  She declines medication      Intermittent left knee pain:   She had improvement in this condition with the chiropractor   She will do the tennis ball stretches. She will keep up the exercise   She is flat footed and needs to wear orthotics in her shoes  She  is not to walk bare footed      She saw dermatology in 2020 and no issues to report      Cold sore inner left lip due to increased stress and history.  She has Valtrex 1GM on hand for outbreaks   She can use OTC Abreva.     GAVIOTA: She was having difficulty adjusting to the CPAP so she turned it back in and will retry in 2/2024. The cardiologist was not concerned she was not wearing the CPAP.  She is not interested in Inspire.   Symptoms include snoring, night time awakenings, nocturia.   PSG 4/12/23 --> mild GAVIOTA, RDI 3%13.4, RDI 4% 8, OLVIN 0; SpO2 karlee 86%.   She saw Dr Stiles in 8/2023 and continue using CPAP nightly      Vitamin D def:  Continue OTC Vitamin D 2000 UT daily      She had GARCIA and BSO with Dr Killian in 6/19. Bx was precancerous.   She does not need any HRT.   Bladder has mild anterior prolapse and continue Kegels   Mammo was normal in 2/2023.   Breast exam normal 7/2023      BD in 2/2023 T-2.5. Her mother has OP and started having fractures in her late 50's Her mother is doing Prolia injections. Continue Fosamax 70 mg weekly.   She started Fosamax after getting clearance from the dentist.   She has been on Fosamax since 10/2023.   Continue OTC Calcium 600 mg QD (she had constipation with the higher doses of calcium), OTC Vitamin D 2000 UT daily and eat 2 servings of calcium enriched foods daily. Recommend adding a stool softener to help with constipation she gets from the calcium supplement. Recommend she add Mg glycinate 400 mg nightly   Discussed importance of weight bearing exercises      Colonoscopy in 7/17 and normal and Q 10      Ophth:  Her last eye exam was in 1/2022 and sees yearly.   She has early cataracts OD, no glaucoma or MD.   She will have her next eye exam faxed to my office in order to update her medical records.     She has a LW and MPOA.  Recommend she bring a copy of her LW and MPOA in office to have scanned in her chart 7/2023      Hep C 6/18 -    Flu 10/2022, 11/2023   TDAP  6/5/2020  Arexvy will hold off this year 2023   Prevnar 20 11/14/2023  Pneumovax never  Shingrix 6/18/2020 and 9/9/2020   Pfizer CVD vaccine 3/4/2021 and 3/25/2021 booster 11/19/2021, 10/18/2022     Some elements in the chart were copied from Dr. Edgar's last office visit with patient. Notes have been updated where appropriate, and reflect my current medical decision making from today.      RTC in 6 months for iMCR or sooner if needed       Scribe Attestation  By signing my name below, I, Hyun Elder , Scribe   attest that this documentation has been prepared under the direction and in the presence of Deepika Egdar MD.

## 2023-11-14 ENCOUNTER — OFFICE VISIT (OUTPATIENT)
Dept: PRIMARY CARE | Facility: CLINIC | Age: 68
End: 2023-11-14
Payer: MEDICARE

## 2023-11-14 VITALS
WEIGHT: 204 LBS | SYSTOLIC BLOOD PRESSURE: 118 MMHG | DIASTOLIC BLOOD PRESSURE: 70 MMHG | HEART RATE: 69 BPM | HEIGHT: 65 IN | OXYGEN SATURATION: 99 % | TEMPERATURE: 97 F | BODY MASS INDEX: 33.99 KG/M2

## 2023-11-14 DIAGNOSIS — I47.10 PAROXYSMAL SUPRAVENTRICULAR TACHYCARDIA (CMS-HCC): ICD-10-CM

## 2023-11-14 DIAGNOSIS — G50.0 TRIGEMINAL NEURALGIA: ICD-10-CM

## 2023-11-14 DIAGNOSIS — Z00.00 HEALTHCARE MAINTENANCE: ICD-10-CM

## 2023-11-14 DIAGNOSIS — I48.0 PAROXYSMAL ATRIAL FIBRILLATION (MULTI): ICD-10-CM

## 2023-11-14 DIAGNOSIS — E78.2 COMBINED HYPERLIPIDEMIA: ICD-10-CM

## 2023-11-14 DIAGNOSIS — R60.9 DEPENDENT EDEMA: ICD-10-CM

## 2023-11-14 DIAGNOSIS — I10 BENIGN ESSENTIAL HYPERTENSION: Primary | ICD-10-CM

## 2023-11-14 DIAGNOSIS — M81.0 POSTMENOPAUSAL BONE LOSS: ICD-10-CM

## 2023-11-14 DIAGNOSIS — G47.33 OSA (OBSTRUCTIVE SLEEP APNEA): ICD-10-CM

## 2023-11-14 PROBLEM — J32.9 CHRONIC SINUSITIS: Status: RESOLVED | Noted: 2023-05-18 | Resolved: 2023-11-14

## 2023-11-14 PROBLEM — G47.30 APNEA, SLEEP: Status: RESOLVED | Noted: 2023-07-12 | Resolved: 2023-11-14

## 2023-11-14 PROBLEM — U07.1 COVID-19 VIRUS INFECTION: Status: RESOLVED | Noted: 2023-05-18 | Resolved: 2023-11-14

## 2023-11-14 PROBLEM — G47.20 DISTURBED SLEEP RHYTHM: Status: RESOLVED | Noted: 2023-07-12 | Resolved: 2023-11-14

## 2023-11-14 PROCEDURE — 90677 PCV20 VACCINE IM: CPT | Performed by: INTERNAL MEDICINE

## 2023-11-14 PROCEDURE — G0008 ADMIN INFLUENZA VIRUS VAC: HCPCS | Performed by: INTERNAL MEDICINE

## 2023-11-14 PROCEDURE — 1160F RVW MEDS BY RX/DR IN RCRD: CPT | Performed by: INTERNAL MEDICINE

## 2023-11-14 PROCEDURE — 3078F DIAST BP <80 MM HG: CPT | Performed by: INTERNAL MEDICINE

## 2023-11-14 PROCEDURE — 99214 OFFICE O/P EST MOD 30 MIN: CPT | Performed by: INTERNAL MEDICINE

## 2023-11-14 PROCEDURE — 90662 IIV NO PRSV INCREASED AG IM: CPT | Performed by: INTERNAL MEDICINE

## 2023-11-14 PROCEDURE — 3074F SYST BP LT 130 MM HG: CPT | Performed by: INTERNAL MEDICINE

## 2023-11-14 PROCEDURE — 1126F AMNT PAIN NOTED NONE PRSNT: CPT | Performed by: INTERNAL MEDICINE

## 2023-11-14 PROCEDURE — G0009 ADMIN PNEUMOCOCCAL VACCINE: HCPCS | Performed by: INTERNAL MEDICINE

## 2023-11-14 PROCEDURE — 3008F BODY MASS INDEX DOCD: CPT | Performed by: INTERNAL MEDICINE

## 2023-11-14 PROCEDURE — 1159F MED LIST DOCD IN RCRD: CPT | Performed by: INTERNAL MEDICINE

## 2023-11-14 PROCEDURE — 1036F TOBACCO NON-USER: CPT | Performed by: INTERNAL MEDICINE

## 2023-11-14 ASSESSMENT — PATIENT HEALTH QUESTIONNAIRE - PHQ9
2. FEELING DOWN, DEPRESSED OR HOPELESS: NOT AT ALL
1. LITTLE INTEREST OR PLEASURE IN DOING THINGS: NOT AT ALL
SUM OF ALL RESPONSES TO PHQ9 QUESTIONS 1 AND 2: 0

## 2024-01-05 DIAGNOSIS — I48.0 PAROXYSMAL ATRIAL FIBRILLATION (MULTI): ICD-10-CM

## 2024-01-05 DIAGNOSIS — E78.2 COMBINED HYPERLIPIDEMIA: ICD-10-CM

## 2024-01-05 DIAGNOSIS — I10 BENIGN ESSENTIAL HYPERTENSION: ICD-10-CM

## 2024-01-05 DIAGNOSIS — M81.0 AGE-RELATED OSTEOPOROSIS WITHOUT CURRENT PATHOLOGICAL FRACTURE: ICD-10-CM

## 2024-01-05 RX ORDER — ALENDRONATE SODIUM 70 MG/1
70 TABLET ORAL
Qty: 12 TABLET | Refills: 3 | Status: SHIPPED | OUTPATIENT
Start: 2024-01-05 | End: 2025-01-04

## 2024-01-05 RX ORDER — EZETIMIBE 10 MG/1
10 TABLET ORAL DAILY
Qty: 90 TABLET | Refills: 2 | Status: SHIPPED | OUTPATIENT
Start: 2024-01-05

## 2024-01-05 RX ORDER — APIXABAN 5 MG/1
5 TABLET, FILM COATED ORAL 2 TIMES DAILY
Qty: 180 TABLET | Refills: 3 | Status: SHIPPED | OUTPATIENT
Start: 2024-01-05 | End: 2024-01-22 | Stop reason: WASHOUT

## 2024-01-05 RX ORDER — DILTIAZEM HYDROCHLORIDE 240 MG/1
240 CAPSULE, EXTENDED RELEASE ORAL DAILY
Qty: 90 CAPSULE | Refills: 3 | Status: SHIPPED | OUTPATIENT
Start: 2024-01-05 | End: 2025-01-04

## 2024-01-05 RX ORDER — PROPAFENONE HYDROCHLORIDE 225 MG/1
225 TABLET, COATED ORAL 2 TIMES DAILY
Qty: 180 TABLET | Refills: 3 | Status: SHIPPED | OUTPATIENT
Start: 2024-01-05 | End: 2024-05-22 | Stop reason: ALTCHOICE

## 2024-01-22 DIAGNOSIS — I48.0 PAROXYSMAL ATRIAL FIBRILLATION (MULTI): Primary | ICD-10-CM

## 2024-02-08 ENCOUNTER — APPOINTMENT (OUTPATIENT)
Dept: CARDIOLOGY | Facility: CLINIC | Age: 69
End: 2024-02-08
Payer: MEDICARE

## 2024-02-09 ENCOUNTER — TELEPHONE (OUTPATIENT)
Dept: PRIMARY CARE | Facility: CLINIC | Age: 69
End: 2024-02-09
Payer: MEDICARE

## 2024-02-09 NOTE — TELEPHONE ENCOUNTER
Patient advised for the last 2 days she has  pain on left side of back of head when she lays down she feels as if something is moving in her hair. Asking for appointment please advise ok to schedule for next week?

## 2024-02-12 ENCOUNTER — TELEPHONE (OUTPATIENT)
Dept: PRIMARY CARE | Facility: CLINIC | Age: 69
End: 2024-02-12
Payer: MEDICARE

## 2024-02-12 NOTE — TELEPHONE ENCOUNTER
Today when she called she felt like there was some swelling and pain in the back of the head so she was advised to go to emergency room, just wanted to send you this in case you want to follow .

## 2024-02-12 NOTE — TELEPHONE ENCOUNTER
There was a note on how she was feeling that was edited to add comment, she did go to emergency room.

## 2024-02-15 ENCOUNTER — HOSPITAL ENCOUNTER (EMERGENCY)
Facility: HOSPITAL | Age: 69
Discharge: HOME | End: 2024-02-15
Payer: MEDICARE

## 2024-02-15 ENCOUNTER — APPOINTMENT (OUTPATIENT)
Dept: RADIOLOGY | Facility: HOSPITAL | Age: 69
End: 2024-02-15
Payer: MEDICARE

## 2024-02-15 VITALS
HEIGHT: 65 IN | HEART RATE: 84 BPM | OXYGEN SATURATION: 97 % | WEIGHT: 205 LBS | RESPIRATION RATE: 18 BRPM | SYSTOLIC BLOOD PRESSURE: 138 MMHG | DIASTOLIC BLOOD PRESSURE: 78 MMHG | BODY MASS INDEX: 34.16 KG/M2 | TEMPERATURE: 98.2 F

## 2024-02-15 DIAGNOSIS — R51.9 NONINTRACTABLE HEADACHE, UNSPECIFIED CHRONICITY PATTERN, UNSPECIFIED HEADACHE TYPE: Primary | ICD-10-CM

## 2024-02-15 LAB
ALBUMIN SERPL BCP-MCNC: 4.3 G/DL (ref 3.4–5)
ALP SERPL-CCNC: 99 U/L (ref 33–136)
ALT SERPL W P-5'-P-CCNC: 19 U/L (ref 7–45)
ANION GAP SERPL CALC-SCNC: 13 MMOL/L (ref 10–20)
AST SERPL W P-5'-P-CCNC: 18 U/L (ref 9–39)
BASOPHILS # BLD AUTO: 0.02 X10*3/UL (ref 0–0.1)
BASOPHILS NFR BLD AUTO: 0.4 %
BILIRUB SERPL-MCNC: 0.4 MG/DL (ref 0–1.2)
BUN SERPL-MCNC: 12 MG/DL (ref 6–23)
CALCIUM SERPL-MCNC: 8.9 MG/DL (ref 8.6–10.3)
CHLORIDE SERPL-SCNC: 105 MMOL/L (ref 98–107)
CO2 SERPL-SCNC: 26 MMOL/L (ref 21–32)
CREAT SERPL-MCNC: 0.65 MG/DL (ref 0.5–1.05)
EGFRCR SERPLBLD CKD-EPI 2021: >90 ML/MIN/1.73M*2
EOSINOPHIL # BLD AUTO: 0.15 X10*3/UL (ref 0–0.7)
EOSINOPHIL NFR BLD AUTO: 3 %
ERYTHROCYTE [DISTWIDTH] IN BLOOD BY AUTOMATED COUNT: 14 % (ref 11.5–14.5)
GLUCOSE SERPL-MCNC: 93 MG/DL (ref 74–99)
HCT VFR BLD AUTO: 43.3 % (ref 36–46)
HGB BLD-MCNC: 14.3 G/DL (ref 12–16)
IMM GRANULOCYTES # BLD AUTO: 0.01 X10*3/UL (ref 0–0.7)
IMM GRANULOCYTES NFR BLD AUTO: 0.2 % (ref 0–0.9)
LYMPHOCYTES # BLD AUTO: 2.03 X10*3/UL (ref 1.2–4.8)
LYMPHOCYTES NFR BLD AUTO: 40.2 %
MAGNESIUM SERPL-MCNC: 2.2 MG/DL (ref 1.6–2.4)
MCH RBC QN AUTO: 27.8 PG (ref 26–34)
MCHC RBC AUTO-ENTMCNC: 33 G/DL (ref 32–36)
MCV RBC AUTO: 84 FL (ref 80–100)
MONOCYTES # BLD AUTO: 0.47 X10*3/UL (ref 0.1–1)
MONOCYTES NFR BLD AUTO: 9.3 %
NEUTROPHILS # BLD AUTO: 2.37 X10*3/UL (ref 1.2–7.7)
NEUTROPHILS NFR BLD AUTO: 46.9 %
NRBC BLD-RTO: 0 /100 WBCS (ref 0–0)
PLATELET # BLD AUTO: 281 X10*3/UL (ref 150–450)
POTASSIUM SERPL-SCNC: 4 MMOL/L (ref 3.5–5.3)
PROT SERPL-MCNC: 7.6 G/DL (ref 6.4–8.2)
RBC # BLD AUTO: 5.15 X10*6/UL (ref 4–5.2)
SODIUM SERPL-SCNC: 140 MMOL/L (ref 136–145)
VIT B12 SERPL-MCNC: 767 PG/ML (ref 211–911)
WBC # BLD AUTO: 5.1 X10*3/UL (ref 4.4–11.3)

## 2024-02-15 PROCEDURE — 99284 EMERGENCY DEPT VISIT MOD MDM: CPT | Mod: 25

## 2024-02-15 PROCEDURE — 70450 CT HEAD/BRAIN W/O DYE: CPT

## 2024-02-15 PROCEDURE — 70450 CT HEAD/BRAIN W/O DYE: CPT | Performed by: RADIOLOGY

## 2024-02-15 PROCEDURE — 83735 ASSAY OF MAGNESIUM: CPT | Performed by: PHYSICIAN ASSISTANT

## 2024-02-15 PROCEDURE — 85025 COMPLETE CBC W/AUTO DIFF WBC: CPT | Performed by: PHYSICIAN ASSISTANT

## 2024-02-15 PROCEDURE — 80053 COMPREHEN METABOLIC PANEL: CPT | Performed by: PHYSICIAN ASSISTANT

## 2024-02-15 PROCEDURE — 36415 COLL VENOUS BLD VENIPUNCTURE: CPT | Performed by: PHYSICIAN ASSISTANT

## 2024-02-15 PROCEDURE — 82607 VITAMIN B-12: CPT | Mod: STJLAB | Performed by: PHYSICIAN ASSISTANT

## 2024-02-15 ASSESSMENT — PAIN SCALES - GENERAL
PAINLEVEL_OUTOF10: 1
PAINLEVEL_OUTOF10: 1
PAINLEVEL_OUTOF10: 0 - NO PAIN

## 2024-02-15 ASSESSMENT — LIFESTYLE VARIABLES
EVER FELT BAD OR GUILTY ABOUT YOUR DRINKING: NO
HAVE YOU EVER FELT YOU SHOULD CUT DOWN ON YOUR DRINKING: NO
HAVE PEOPLE ANNOYED YOU BY CRITICIZING YOUR DRINKING: NO
EVER HAD A DRINK FIRST THING IN THE MORNING TO STEADY YOUR NERVES TO GET RID OF A HANGOVER: NO

## 2024-02-15 ASSESSMENT — PAIN - FUNCTIONAL ASSESSMENT: PAIN_FUNCTIONAL_ASSESSMENT: 0-10

## 2024-02-15 ASSESSMENT — PAIN DESCRIPTION - DESCRIPTORS: DESCRIPTORS: HEADACHE

## 2024-02-15 ASSESSMENT — PAIN DESCRIPTION - ORIENTATION: ORIENTATION: LEFT;POSTERIOR

## 2024-02-15 ASSESSMENT — COLUMBIA-SUICIDE SEVERITY RATING SCALE - C-SSRS
1. IN THE PAST MONTH, HAVE YOU WISHED YOU WERE DEAD OR WISHED YOU COULD GO TO SLEEP AND NOT WAKE UP?: NO
2. HAVE YOU ACTUALLY HAD ANY THOUGHTS OF KILLING YOURSELF?: NO
6. HAVE YOU EVER DONE ANYTHING, STARTED TO DO ANYTHING, OR PREPARED TO DO ANYTHING TO END YOUR LIFE?: NO

## 2024-02-15 ASSESSMENT — PAIN DESCRIPTION - PAIN TYPE: TYPE: ACUTE PAIN

## 2024-02-15 ASSESSMENT — PAIN DESCRIPTION - LOCATION: LOCATION: HEAD

## 2024-02-15 NOTE — ED PROVIDER NOTES
"HPI   Chief Complaint   Patient presents with    Headache     Pt reports left posterior headache/ strange sensation. Started 2/5/24. Pt reports PCP referred to ED.   Denies any stroke like symptoms, denies vision changes.            This is a 68-year-old female with a past medical history of hypertension, hyperlipidemia, paroxysmal A-fib, paroxysmal SVT who presents to the emergency department with concern for a week and a half history of swelling in her left posterior scalp, \"crawling sensation\" and mild pain.  Symptoms have been bothering her and interfering with her sleep.  Reached out to her primary care provider's office (Dr. Deepika Edgar) and was told to present to the emergency department for a CT scan.  The patient denies any associated dizziness, lightheadedness, vision changes, nausea, vomiting, extremity motor weakness or numbness or tingling sensations.  Rates the pain 1-2 out of 10 and when worst about 4 out of 10.  No particular exacerbating or alleviating factors.  Tried naproxen with some relief.                Fiatt Coma Scale Score: 15                     Patient History   Past Medical History:   Diagnosis Date    Acute upper respiratory infection, unspecified 04/08/2014    Acute upper respiratory infection    Atypical squamous cells of undetermined significance on cytologic smear of cervix (ASC-US)     Pap smear abnormality of cervix with ASCUS favoring benign    Body mass index (BMI) 32.0-32.9, adult 06/05/2020    BMI 32.0-32.9,adult    Cough, unspecified 04/22/2014    Cough    Encounter for gynecological examination (general) (routine) without abnormal findings 12/03/2013    Encounter for gynecological examination without abnormal finding    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    Complex endometrial hyperplasia with atypia    Personal history of other diseases of the female genital tract 04/23/2019    History of postmenopausal bleeding    Personal history of other diseases " of the musculoskeletal system and connective tissue     History of low back pain    Personal history of other diseases of the nervous system and sense organs 2015    History of earache    Personal history of other diseases of the respiratory system 2015    History of upper respiratory infection    Personal history of other diseases of the respiratory system 2014    History of acute bronchitis    Personal history of other diseases of urinary system     History of hematuria    Personal history of other mental and behavioral disorders     History of anxiety    Personal history of other specified conditions 2018    History of tachycardia    Personal history of other specified conditions     History of abdominal pain    Personal history of other specified conditions 2013    History of diarrhea    Urinary tract infection, site not specified 2019    Acute UTI     Past Surgical History:   Procedure Laterality Date     SECTION, CLASSIC  2013     Section    OTHER SURGICAL HISTORY  2020    Hysterectomy total abdominal with removal of both ovaries     No family history on file.  Social History     Tobacco Use    Smoking status: Never    Smokeless tobacco: Never   Substance Use Topics    Alcohol use: Yes     Comment: social    Drug use: Never       Physical Exam   ED Triage Vitals [02/15/24 0906]   Temperature Heart Rate Respirations BP   36.7 °C (98.1 °F) 80 16 160/79      Pulse Ox Temp Source Heart Rate Source Patient Position   97 % Temporal Monitor Sitting      BP Location FiO2 (%)     Right arm --       Physical Exam  Vitals reviewed.   Constitutional:       Appearance: Normal appearance.   HENT:      Head:      Comments: Mild swelling in the left posterior scalp, no overlying skin abnormalities including redness or lesions, nontender  Musculoskeletal:      Cervical back: Normal range of motion and neck supple.   Skin:     General: Skin is warm and dry.  "  Neurological:      General: No focal deficit present.      Mental Status: She is alert and oriented to person, place, and time.      Coordination: Coordination normal.      Gait: Gait normal.   Psychiatric:         Mood and Affect: Mood normal.         Behavior: Behavior normal.         ED Course & MDM        Medical Decision Making  68-year-old female, is alert and oriented x 3, afebrile and hemodynamically stable presenting with concern for left posterior scalp swelling, \"crawling sensation\" and mild pain.    Examination reveals no skin abnormalities including erythema or warmth or drainage or fluctuance in the left posterior scalp.    She is neurologically intact, pupils are equal, round, reactive bilaterally, EOMs are intact, neck is supple, no dysmetria noted.    Notified her PCP, Dr. Edgar.  CT of the head was ordered and was recommended for liver function panel, B12, magnesium.    CT without acute findings.  CMP, CBC, magnesium without significant abnormalities.  B12 pending, notified that this is sent to main campus and would not result today.    At this time, the patient is stable for discharge home.  Ambulating and neurologically intact.  She has a follow-up appointment with her primary care provider on Tuesday.  Advised to follow-up as scheduled.        Procedure  Procedures     Kevin Abbasi PA-C  02/15/24 1439    "

## 2024-02-19 ENCOUNTER — OFFICE VISIT (OUTPATIENT)
Dept: PRIMARY CARE | Facility: CLINIC | Age: 69
End: 2024-02-19
Payer: MEDICARE

## 2024-02-19 VITALS
TEMPERATURE: 97.6 F | WEIGHT: 212 LBS | DIASTOLIC BLOOD PRESSURE: 70 MMHG | OXYGEN SATURATION: 98 % | HEART RATE: 60 BPM | HEIGHT: 65 IN | SYSTOLIC BLOOD PRESSURE: 120 MMHG | BODY MASS INDEX: 35.32 KG/M2

## 2024-02-19 DIAGNOSIS — G47.33 OSA (OBSTRUCTIVE SLEEP APNEA): ICD-10-CM

## 2024-02-19 DIAGNOSIS — I48.0 PAROXYSMAL ATRIAL FIBRILLATION (MULTI): ICD-10-CM

## 2024-02-19 DIAGNOSIS — I10 BENIGN ESSENTIAL HYPERTENSION: ICD-10-CM

## 2024-02-19 DIAGNOSIS — G44.86 CERVICOGENIC HEADACHE: Primary | ICD-10-CM

## 2024-02-19 DIAGNOSIS — G50.0 TRIGEMINAL NEURALGIA: ICD-10-CM

## 2024-02-19 DIAGNOSIS — E66.01 OBESITY, MORBID (MULTI): ICD-10-CM

## 2024-02-19 PROBLEM — I47.10 PAROXYSMAL SUPRAVENTRICULAR TACHYCARDIA (CMS-HCC): Status: RESOLVED | Noted: 2023-05-18 | Resolved: 2024-02-19

## 2024-02-19 PROBLEM — G47.30 SLEEP DISORDER BREATHING: Status: RESOLVED | Noted: 2023-07-12 | Resolved: 2024-02-19

## 2024-02-19 PROBLEM — R60.9 DEPENDENT EDEMA: Status: RESOLVED | Noted: 2023-05-18 | Resolved: 2024-02-19

## 2024-02-19 PROCEDURE — 1036F TOBACCO NON-USER: CPT | Performed by: INTERNAL MEDICINE

## 2024-02-19 PROCEDURE — 1123F ACP DISCUSS/DSCN MKR DOCD: CPT | Performed by: INTERNAL MEDICINE

## 2024-02-19 PROCEDURE — 3078F DIAST BP <80 MM HG: CPT | Performed by: INTERNAL MEDICINE

## 2024-02-19 PROCEDURE — 1159F MED LIST DOCD IN RCRD: CPT | Performed by: INTERNAL MEDICINE

## 2024-02-19 PROCEDURE — 99215 OFFICE O/P EST HI 40 MIN: CPT | Performed by: INTERNAL MEDICINE

## 2024-02-19 PROCEDURE — 1126F AMNT PAIN NOTED NONE PRSNT: CPT | Performed by: INTERNAL MEDICINE

## 2024-02-19 PROCEDURE — 1160F RVW MEDS BY RX/DR IN RCRD: CPT | Performed by: INTERNAL MEDICINE

## 2024-02-19 PROCEDURE — 3074F SYST BP LT 130 MM HG: CPT | Performed by: INTERNAL MEDICINE

## 2024-02-19 PROCEDURE — 1158F ADVNC CARE PLAN TLK DOCD: CPT | Performed by: INTERNAL MEDICINE

## 2024-02-19 PROCEDURE — 3008F BODY MASS INDEX DOCD: CPT | Performed by: INTERNAL MEDICINE

## 2024-02-19 RX ORDER — ACETAMINOPHEN 500 MG
1000 TABLET ORAL EVERY 8 HOURS PRN
Qty: 30 TABLET | Refills: 11 | Status: SHIPPED | OUTPATIENT
Start: 2024-02-19 | End: 2024-04-25

## 2024-02-19 RX ORDER — METHYLPREDNISOLONE 4 MG/1
TABLET ORAL
Qty: 21 TABLET | Refills: 0 | Status: SHIPPED | OUTPATIENT
Start: 2024-02-19 | End: 2024-02-26

## 2024-02-19 ASSESSMENT — PATIENT HEALTH QUESTIONNAIRE - PHQ9
SUM OF ALL RESPONSES TO PHQ9 QUESTIONS 1 AND 2: 0
1. LITTLE INTEREST OR PLEASURE IN DOING THINGS: NOT AT ALL
2. FEELING DOWN, DEPRESSED OR HOPELESS: NOT AT ALL

## 2024-02-19 NOTE — PROGRESS NOTES
Subjective   Patient ID: Luba Coates is a 68 y.o. female who presents for hospital follow up, Seen in the ED 2/15/2024 for HA    She was having the sensation that something was crawling behind her ear.  She denies using any new products   She denies radicular pain and nausea   She went to the ED. The ED physician mentioned occipital neuralgia   She took Naproxen for a couple days and her HA and the crawling sensation improved   This morning when she woke up she had the pain again   The last time she took a Medrol Dose Daniel she had a flare up of dermatitis when she completed the daniel and had to take an antihistamine   She gets some relief using heat on the area but the relief is short lived     She is walking on a treadmill daily. It does not affect her HA   She gained 5 pounds after starting to exercise   She is tired of her weight       HEALTH:   PAP 2013 HPV -, 5/17 -, TRH/BSO in 6/19 and no longer needed   - D/C 4/19 + polyp -   Mammo 7/17 -, 6/18, 7/2020, 2/2022, 2/2023  BD 6/18 T-1.7, 2/2023 T-2.5 and started fosamax in 10/2023  Colon 7/17 normal and Q 10   Ca+ cardiac score 6/18 was Zero  EKG 2010, 9/15, 4/17, 4/19, 2/2020, 10/2022, 7/2023, 11/2023 cardio  Urine 5/18,  Hep C 6/18 -    Flu 10/2022, 11/2023   TDAP 6/5/2020  RSV will hold off this year 2023  Prevnar 20 11/14/2023  Pneumovax never  Shingrix 6/18/2020 and 9/9/2020   Pfizer CVD 3/2021 and 3/2021 booster 11/2021, 10/2022  Ophth Her last eye exam was in 1/2022. She has early cataracts OD, no glaucoma or MD.       Review of Systems  All systems negative except those listed in the HPI       Objective   Visit Vitals  /70 (BP Location: Left arm)   Pulse 60   Temp 36.4 °C (97.6 °F) (Temporal)    Body mass index is 35.28 kg/m².      Physical Exam  Vitals reviewed.   Constitutional:       Appearance: Normal appearance. She is obese.   HENT:      Head: Normocephalic.      Right Ear: Tympanic membrane, ear canal and external ear normal.      Left Ear:  Tympanic membrane, ear canal and external ear normal.      Nose: Nose normal.      Mouth/Throat:      Pharynx: Oropharynx is clear.   Eyes:      Conjunctiva/sclera: Conjunctivae normal.   Cardiovascular:      Rate and Rhythm: Normal rate and regular rhythm.      Pulses: Normal pulses.      Heart sounds: Normal heart sounds.   Pulmonary:      Effort: Pulmonary effort is normal.      Breath sounds: Normal breath sounds.   Abdominal:      General: Bowel sounds are normal.      Palpations: Abdomen is soft.   Musculoskeletal:         General: Normal range of motion.      Cervical back: Normal range of motion and neck supple.   Skin:     General: Skin is warm.   Neurological:      General: No focal deficit present.      Mental Status: She is alert and oriented to person, place, and time.      Comments: no neurological deficits noted   Psychiatric:         Mood and Affect: Mood normal.         Behavior: Behavior normal.         Thought Content: Thought content normal.         Judgment: Judgment normal.       Assessment/Plan   Problem List Items Addressed This Visit       Benign essential hypertension    GAVIOTA (obstructive sleep apnea)    Paroxysmal atrial fibrillation (CMS/HCC)    Trigeminal neuralgia     Other Visit Diagnoses       Cervicogenic headache    -  Primary    Relevant Medications    methylPREDNISolone (Medrol Dospak) 4 mg tablets            Hospital follow up completed  Hospital notes reviewed and medication reconciliation performed with patient  Reviewed labs and imaging from the hospital     She was having the sensation that something was crawling behind her ear.  She denies using any new products   She denies radicular pain and nausea   She went to the ED. The ED physician mentioned occipital neuralgia   She took Naproxen for a couple days and her HA and the crawling sensation improved   This morning when she woke up she had the pain again   -- Explained these cervicogenic HA may last some time   -- Discussed  a Medrol Dose Daniel versus gabapentin along with possible side effects with each medication. She would like to hold off on these medications for now.   She gets some relief using heat on the area but the relief is short lived    -- Continue applying heat prn for relief of pain   -- Recommend taking Tylenol 1000 mg BID for 10 days. If NI in 10 days she will consider trying the Medrol Dose Daniel.   -- Recommend she evaluate her pillow and how she is sleeping   -- She admits she is always on her phone. Recommend she put the phone down.  She is walking on a treadmill daily. It does not affect her HA   She is getting a massage every month. Recommend she get a massage Q 2 weeks   She is going on a cruise early March 2024. Prescription sent in for Medrol Dose Daniel to have on hand if needed. She will send me an e- mail if she uses the Medrol.    Cervicogenic HA:   Seen in the ED 2/15/2024 for HA: On exam: no neurological deficits noted 2/2024  CT without acute findings. CMP, CBC, magnesium without significant abnormalities.   Nothing found and patient was discharged     Her weight in office last visit was 212 with BMI of 35.98. We spent 15 minutes discussing diet and exercise. The struggle of weight loss persists   Discussed importance of getting her BMI as close to 25 as possible.   She is walking on a treadmill daily.      A-fib diagnosed 2/5/2020:    Continue propafenone 225 mg BID prn for breakthrough palpitations   Continue Eliquis 5 mg BID and HCTZ 12.5 mg daily  Continue diltiazem 240 mg daily   Stress test, ECHO, and testing was normal no valve disease or clot 2/2020.   Holter monitor in 2/2020, she was in normal sinus rhythm, there was no atrial fibrillation. No significant tachycardia.   She saw Dr Roberts in 12/2021   She saw Dr Buchanan in 11/2023 and will see her back in 3 months      HTN : Stable   Continue diltiazem 240 mg daily and propafenone 225 mg BID.   She takes an extra propafenone if she is in a-fib   She  was experiencing heart palpitations due to increased stress   Bystolic helped but she had diarrhea with it so we weaned her off.  Palpitations were related to alcohol intake as well  EKG sinus rhythm, HR 70 bpm 11/2023, no LVH or strain pattern noted   Recommend she monitor her BP at home and call with elevated readings   She saw Dr Buchanan in 11/2023 and will see her back in 3 months      I have spent 15 min face to face with this patient discussing their cardiac risk and behavioral therapies of nutrition choices and exercise. We are trying to eliminate habits that are contributing to their cardiac risk.  We agreed on a plan of how they can reduce their current CV risk   The patient's  10 yr CV risk was estimated at  9.9 % 2/2024      HLD:  and HDL 60 on labs in 5/2023.   Explained goal for LDL to be less than 100 and ideally less than 70    She admits she eats a lot of cheese. Recommend she limit dairy intake   Continue Zetia 20 mg daily   Discontinued simvastatin due to LE weakness   Discontinued Crestor due to aching and nightmares  She stopped pravastatin due to developing a rash.  She failed Livalo due to cost     Ca cardiac score in 10/2021 showed score of ZERO, unchanged 1.6 cm low density left hepatic lobe lesion may represent a cyst but suboptimally evaluated without contrast   She has to commit to diet and exercise. Offered patient referral to dietician, she declines stating she knows what she needs to do.      BPPV: Stable   When her ears get congested she has vertigo Sx   Continue Epley maneuver prn      BLE :  Stable.   She is taking a diuretic prn   Recommend she begin wearing support stockings daily   Elevate legs 45 minutes at night      Mild situational depression:   She moved her mother to assisted living. Her mother is 91 y/o.   Her mother likes the new place and the patient sold her mothers house   Her mother has had multiple medical situations this year 2023.   Her mother's cognitive  ability has decreased greatly    She has no family around to help her with her mother.   She has a friend that relies on her quite a bit    She takes care of her  with his health issues    She is mentally and physically exhausted from taking care of others  Her father passed away in 11/18.    Recommend she speak with a therapist. She declines.   Explained importance of her taking breaks for herself to help her cope   She failed Lexapro due to nausea and fatigue and weaned off.  She declines medication      Intermittent left knee pain:   She had improvement in this condition with the chiropractor   She will do the tennis ball stretches. She will keep up the exercise   She is flat footed and needs to wear orthotics in her shoes  She is not to walk bare footed      She saw dermatology in 2020 and no issues to report      Cold sore inner left lip due to increased stress and history.  She has Valtrex 1GM on hand for outbreaks   She can use OTC Abreva.     GAVIOTA: She was having difficulty adjusting to the CPAP so she turned it back in and will retry in 2/2024. The cardiologist was not concerned she was not wearing the CPAP.  She is not interested in Inspire.   Symptoms include snoring, night time awakenings, nocturia.   PSG 4/12/23 --> mild GAVIOTA, RDI 3%13.4, RDI 4% 8, OLVIN 0; SpO2 karlee 86%.   She saw Dr Stiles in 8/2023 and continue using CPAP nightly      Vitamin D def:  Continue OTC Vitamin D 2000 UT daily      She had GARCIA and BSO with Dr Killian in 6/19. Bx was precancerous.   She does not need any HRT.   Bladder has mild anterior prolapse and continue Kegels   Mammo was normal in 2/2023.   Breast exam normal 7/2023      BD in 2/2023 T-2.5. Her mother has OP and started having fractures in her late 50's Her mother is doing Prolia injections. Continue Fosamax 70 mg weekly.   She started Fosamax after getting clearance from the dentist.   She has been on Fosamax since 10/2023.   Continue OTC Calcium 600 mg QD (she had  constipation with the higher doses of calcium), OTC Vitamin D 2000 UT daily and eat 2 servings of calcium enriched foods daily. Recommend adding a stool softener to help with constipation she gets from the calcium supplement. Recommend she add Mg glycinate 400 mg nightly   Discussed importance of weight bearing exercises      Colonoscopy in 7/17 and normal and Q 10      Ophth:  Her last eye exam was in 1/2022 and sees yearly.   She has early cataracts OD, no glaucoma or MD.   She will have her next eye exam faxed to my office in order to update her medical records.     She has a LW and MPOA.  Recommend she bring a copy of her LW and MPOA in office to have scanned in her chart 7/2023      Hep C 6/18 -    Flu 10/2022, 11/2023   TDAP 6/5/2020  RSV will hold off this year 2023  Prevnar 20 11/14/2023  Pneumovax never  Shingrix 6/18/2020 and 9/9/2020   Pfizer CVD 3/2021 and 3/2021 booster 11/2021, 10/2022      Some elements in the chart were copied from Dr. Edgar's last office visit with patient. Notes have been updated where appropriate, and reflect my current medical decision making from today.      RTC in 5/2024 for iMCR or sooner if needed       Scribe Attestation  By signing my name below, I, Hyun Friedman , Scribe   attest that this documentation has been prepared under the direction and in the presence of Deepika Edgar MD.

## 2024-02-20 ENCOUNTER — APPOINTMENT (OUTPATIENT)
Dept: PRIMARY CARE | Facility: CLINIC | Age: 69
End: 2024-02-20
Payer: MEDICARE

## 2024-02-22 ENCOUNTER — APPOINTMENT (OUTPATIENT)
Dept: PRIMARY CARE | Facility: CLINIC | Age: 69
End: 2024-02-22
Payer: MEDICARE

## 2024-04-11 ENCOUNTER — APPOINTMENT (OUTPATIENT)
Dept: CARDIOLOGY | Facility: CLINIC | Age: 69
End: 2024-04-11
Payer: MEDICARE

## 2024-04-25 ENCOUNTER — OFFICE VISIT (OUTPATIENT)
Dept: CARDIOLOGY | Facility: CLINIC | Age: 69
End: 2024-04-25
Payer: MEDICARE

## 2024-04-25 VITALS
BODY MASS INDEX: 34.49 KG/M2 | DIASTOLIC BLOOD PRESSURE: 87 MMHG | WEIGHT: 207 LBS | SYSTOLIC BLOOD PRESSURE: 139 MMHG | OXYGEN SATURATION: 98 % | HEIGHT: 65 IN | HEART RATE: 78 BPM

## 2024-04-25 DIAGNOSIS — I10 BENIGN ESSENTIAL HYPERTENSION: ICD-10-CM

## 2024-04-25 DIAGNOSIS — I48.0 PAROXYSMAL ATRIAL FIBRILLATION (MULTI): Primary | ICD-10-CM

## 2024-04-25 PROCEDURE — 99214 OFFICE O/P EST MOD 30 MIN: CPT | Performed by: STUDENT IN AN ORGANIZED HEALTH CARE EDUCATION/TRAINING PROGRAM

## 2024-04-25 PROCEDURE — 1160F RVW MEDS BY RX/DR IN RCRD: CPT | Performed by: STUDENT IN AN ORGANIZED HEALTH CARE EDUCATION/TRAINING PROGRAM

## 2024-04-25 PROCEDURE — 1123F ACP DISCUSS/DSCN MKR DOCD: CPT | Performed by: STUDENT IN AN ORGANIZED HEALTH CARE EDUCATION/TRAINING PROGRAM

## 2024-04-25 PROCEDURE — 1036F TOBACCO NON-USER: CPT | Performed by: STUDENT IN AN ORGANIZED HEALTH CARE EDUCATION/TRAINING PROGRAM

## 2024-04-25 PROCEDURE — 1159F MED LIST DOCD IN RCRD: CPT | Performed by: STUDENT IN AN ORGANIZED HEALTH CARE EDUCATION/TRAINING PROGRAM

## 2024-04-25 PROCEDURE — 3075F SYST BP GE 130 - 139MM HG: CPT | Performed by: STUDENT IN AN ORGANIZED HEALTH CARE EDUCATION/TRAINING PROGRAM

## 2024-04-25 PROCEDURE — 3079F DIAST BP 80-89 MM HG: CPT | Performed by: STUDENT IN AN ORGANIZED HEALTH CARE EDUCATION/TRAINING PROGRAM

## 2024-04-25 PROCEDURE — 93000 ELECTROCARDIOGRAM COMPLETE: CPT | Performed by: STUDENT IN AN ORGANIZED HEALTH CARE EDUCATION/TRAINING PROGRAM

## 2024-04-25 NOTE — PROGRESS NOTES
"    Cardiac Electrophysiology Office Visit     Referred by Dr. Taylor ref. provider found for   Chief Complaint   Patient presents with    Atrial Fibrillation      HPI:  Luba Coates is a 68 y.o. year old female patient with h/o paroxysmal atrial fibrillation (on Eliquis and propafenone, diltiazem), HLD (multiple statins tried but not able to tolerate due to myalgias) presenting today for follow-up    PMHx/PSHx: As above  Tobacco never, alcohol - social, caffeine use 1 cup/day, drug use - denies  FamHx: Father - AF in 70s, Mother - AF in her 90s,     Objective  Allergies   Allergen Reactions    Nebivolol Other     Leg cramps    Statins-Hmg-Coa Reductase Inhibitors Myalgia    Tramadol Anxiety, Psychosis and Nausea/vomiting     hallucinations    Escitalopram Oxalate Nausea/vomiting      Current Outpatient Medications   Medication Instructions    acetaminophen (TYLENOL EXTRA STRENGTH) 1,000 mg, oral, Every 8 hours PRN    alendronate (FOSAMAX) 70 mg, oral, Every 7 days, Take in the morning with a full glass of water, on an empty stomach, and do not take anything else by mouth or lie down for the next 30 min.    apixaban (ELIQUIS) 5 mg, oral, 2 times daily    ezetimibe (ZETIA) 10 mg, oral, Daily    loratadine (CLARITIN) 10 mg, oral, Daily    propafenone (RYTHMOL) 225 mg, oral, 2 times daily    Tiadylt  mg, oral, Daily      Visit Vitals  /87 (BP Location: Right arm, Patient Position: Sitting)   Pulse 78   Ht 1.651 m (5' 5\")   Wt 93.9 kg (207 lb)   SpO2 98%   BMI 34.45 kg/m²   OB Status Hysterectomy   Smoking Status Never   BSA 2.08 m²      Physical Exam  Vitals reviewed.   Constitutional:       Appearance: Normal appearance.   HENT:      Head: Normocephalic.   Cardiovascular:      Rate and Rhythm: Normal rate and regular rhythm.   Pulmonary:      Effort: Pulmonary effort is normal. No respiratory distress.      Breath sounds: No wheezing.   Skin:     General: Skin is warm and dry.      Capillary Refill: " Capillary refill takes less than 2 seconds.   Neurological:      Mental Status: She is alert.   Psychiatric:         Mood and Affect: Mood normal.      My Interpretation of Reviewed Study(s):  Coronary calcium score 10/7/2021: Total score of 0. Ascending aorta normal dimension.  Echo (Feb 2020): EF 50 to 55%. Mild concentric LVH. Grade 2 diastolic dysfunction. Mild AR, MR, and TR. RVSP 34 mmHg.  Stress echo (Feb 2020): EF at rest 55% increasing to 65% postexercise. No evidence of ischemia.  6-day cardiac monitor (Feb 2020): Sinus rhythm with PACs and PVCs along with 3 episodes of atrial tachycardia with the longest and fastest episode of 4 beats at 151 bpm.     Assessment/Plan   # Paroxysmal atrial fibrillation  # Propafenone monitoring  AF Dx History: July 2020; h/o Cardioversion: No; AAD Use:  Propafenone 150mg BID ; Anticoagulation use: Apixaban 5mg BID (current); h/o Ablation: None; [id number] Score: 3  Pt still notes about 2-3 episodes a month.   Pt did increase her Propafenone. She has some contact dermatitis and was on antihistamine. Most recent more episodes of AF. Aug 8th AF lasting 2 hours.  We had a very detailed discussion about rhythm control. Options of alternative antiarrhythmics are reasonable versus ablative strategy. Patient initially suggested that she would like to wait until she has A-fib more frequently before considering ablation and I counseled that with increasing A-fib frequency it is likely that she will have more A-fib and therefore the efficacy of medications and procedures will go down. Patient is very adverse to an invasive procedure and therefore would like to pursue medication at this time. We discussed our options including increasing dose of propafenone, sotalol/Tikosyn.   Given patient's relatively young age I am very hesitant and do not recommend amiodarone. We did discuss the side effects and concerns with sotalol and Tikosyn and terms of antiarrhythmic and proarrhythmic  effects  c/w AC: Apixaban 5mg BID  c/w Propafenone to 225mg BID,  and Diltiazem ER 240mg Daily  Although based on patient's symptoms there is seems to be an increasing A-fib episode frequency patient still is very hesitant about proceeding with an ablative strategy or advancing to alternative antiarrhythmic and therefore we will continue with current medication regimen    Return to Clinic: Patient should return to the EP Clinic in 6 months    Kiko Buchanan MD [facility]  Cardiac Electrophysiology  [email or web address]    **Disclaimer: This note was dictated by speech recognition, and every effort has been made to prevent any error in transcription, however minor errors may be present**

## 2024-05-14 ENCOUNTER — APPOINTMENT (OUTPATIENT)
Dept: PRIMARY CARE | Facility: CLINIC | Age: 69
End: 2024-05-14
Payer: MEDICARE

## 2024-05-21 NOTE — PROGRESS NOTES
"Subjective   Patient ID: Luba Coates is a 68 y.o. female who presents for her 3 month follow up multiple medical conditions         She saw a different cardiologist .   She started getting a- fib more often and the episodes were lasting longer   She is finally at the point she is considering an ablation  She saw cardiology Monday 5/20/2024. She is scheduled for another ECHO 6/6/2024  She has not had heart palpitations or a- fib since beginning Multaq  She is more fatigued and that makes her go to bed earlier   She is sleeping 6- 8 hours at a time. She has diarrhea but no abdominal pain   The diarrhea is \"fluffy\" and not watery   Cardiology is going to try Nexletol next for lipid control      On her scale at home she weighed 211 pounds.  She has started eating smaller portions and being more mindful of her eating habits     She is going to revisit using the CPAP again     HEALTH:   PAP 2013, 5/17, TRH/BSO in 6/19 and no longer needs to repeat   - D/C 4/19 + polyp -   Mammo 7/17, 6/18, 7/20, 2/22, 2/23  BD 6/18 T-1.7, 2/23 T-2.5   --  started fosamax in 10/2023   Colon 7/17 normal and Q 10   Ca+ cardiac score 6/18 was Zero  EKG  9/15, 4/17, 4/19, 2/20, 10/22, 7/23, 11/23, 4/24  cardio  Urine 5/18,  Hep C 6/18 -    Flu 10/22, 11/23   TDAP 6/5/2020  RSV will hold off this year 2023  Prevnar 20 11/14/2023  Pneumovax never  Shingrix 6/18/2020 and 9/9/2020   Pfizer CVD 3/2021 and 3/2021 booster 11/2021, 10/2022  Ophth Her last eye exam was in 1/2022. She has early cataracts OD, no glaucoma or MD.         Review of Systems  All systems negative except those listed in the HPI       Objective   Visit Vitals  /70 (BP Location: Left arm, Patient Position: Sitting)   Pulse 64   Temp 36.3 °C (97.4 °F) (Temporal)    Body mass index is 34.11 kg/m².      Physical Exam  Vitals reviewed.   Constitutional:       Appearance: Normal appearance. She is obese.   HENT:      Head: Normocephalic.      Right Ear: Tympanic membrane, " ear canal and external ear normal.      Left Ear: Tympanic membrane, ear canal and external ear normal.      Nose: Nose normal.      Mouth/Throat:      Pharynx: Oropharynx is clear.   Eyes:      Conjunctiva/sclera: Conjunctivae normal.   Cardiovascular:      Rate and Rhythm: Normal rate and regular rhythm.      Pulses: Normal pulses.      Heart sounds: Normal heart sounds.   Pulmonary:      Effort: Pulmonary effort is normal.      Breath sounds: Normal breath sounds.   Abdominal:      General: Bowel sounds are normal.      Palpations: Abdomen is soft.   Musculoskeletal:         General: Normal range of motion.      Cervical back: Normal range of motion and neck supple.   Skin:     General: Skin is warm.   Neurological:      General: No focal deficit present.      Mental Status: She is alert and oriented to person, place, and time.   Psychiatric:         Mood and Affect: Mood normal.         Behavior: Behavior normal.         Thought Content: Thought content normal.         Judgment: Judgment normal.       Assessment/Plan   Problem List Items Addressed This Visit       Benign essential hypertension - Primary    Combined hyperlipidemia    Relevant Orders    Lipid Panel    GAVIOTA (obstructive sleep apnea)    Paroxysmal atrial fibrillation (Multi)    Relevant Orders    TSH with reflex to Free T4 if abnormal    Postmenopausal bone loss    Trigeminal neuralgia       Follow up completed  Labs ordered     She is  with 1 daughter. She denies previous history of tobacco use  Her dad passed away in 11/18 from dementia. Her mother had a CVA   She has been taking care of her  who has Myasthenia Gravis    She also has a friend that relies on her for quite a bit.       Her weight in office last visit was 205 with BMI of 34.11. We spent 15 minutes discussing diet and exercise. The struggle of weight loss persists   On her scale at home she weighed 211 pounds. She has started eating smaller portions and being more mindful  of her eating habits    She weighed 202 pounds this morning   Recommend she look into a plant based/ whole foods diet       A-fib diagnosed 2/5/2020:    Continue Tiadylt 240 mg daily and Multaq 400 mg BID   Continue Eliquis 5 mg BID     Stress test, ECHO, and testing was normal no valve disease or clot 2/2020.   Holter monitor in 2/2020, she was in normal sinus rhythm, there was no atrial fibrillation. No significant tachycardia.   She saw Dr Roebrts in 12/2021   She saw Dr Buchanan in 4/2024    She saw cardiology Monday 5/20/2024. She is scheduled for another ECHO 6/6/2024  She has not had heart palpitations or a- fib since beginning Multaq      HTN : Stable   Continue Tiadylt 240 mg daily  She takes an extra propafenone if she is in a-fib   She was experiencing heart palpitations due to increased stress   Bystolic helped but she had diarrhea with it so we weaned her off.  Palpitations were related to alcohol intake as well  EKG 4/24 sinus with cardiology   Recommend she monitor her BP at home and call with elevated readings   She saw Dr Buchanan in 4/2023      We spent 15 minutes discussing cardiac risk   We discussed the patients cardiovascular risk. If needed, lifestyle modifications recommended including: behavioral therapies of nutrition choices, exercise and eliminate habits that are contributing to their cardiac risk. We agreed to a plan to decrease his cardiovascular risks. Discussed ASA. Reviewed Guidelines and approved recommendations made to patient.   The patient's 10 yr CV risk was estimated at  10% 5/2024      HLD: Labs ordered and we will adjust if indicated  5/2024. Cardiology is going to try Nexletol next for lipid control   Explained goal for LDL to be less than 100 and ideally less than 70    She admits she eats a lot of cheese. Recommend she limit dairy intake   Continue Zetia 20 mg daily   Discontinued simvastatin due to LE weakness   Discontinued Crestor due to aching and nightmares  She stopped  pravastatin due to developing a rash.  She failed Livalo due to cost     Ca cardiac score in 10/2021 showed score of ZERO, unchanged 1.6 cm low density left hepatic lobe lesion may represent a cyst but suboptimally evaluated without contrast   She has to commit to diet and exercise. Offered patient referral to dietician, she declines stating she knows what she needs to do.   Recommend she look into a plant based/ whole foods diet     Cervicogenic HA:   Seen in the ED 2/15/2024 for HA: On exam: no neurological deficits noted 2/2024  CT without acute findings. CMP, CBC, magnesium without significant abnormalities.   Nothing found and patient was discharged       BPPV: Stable   When her ears get congested she has vertigo Sx   Continue Epley maneuver prn      BLE :  Stable.   She is taking a diuretic prn   Recommend she begin wearing support stockings daily   Elevate legs 45 minutes at night      Mild situational depression:   She moved her mother to assisted living. Her mother is 91 y/o.   Her mother's cognitive ability has decreased greatly    She has no family around to help her with her mother.   She has a friend that relies on her quite a bit    She takes care of her  with his health issues    She is mentally and physically exhausted from taking care of others  Her father passed away in 11/18.    Recommend she speak with a therapist. She declines.   Explained importance of her taking breaks for herself to help her cope   She failed Lexapro due to nausea and fatigue and weaned off.  She declines medication      Intermittent left knee pain:   She had improvement in this condition with the chiropractor   She will do the tennis ball stretches. She will keep up the exercise   She is flat footed and needs to wear orthotics in her shoes  She is not to walk bare footed      She saw dermatology in 2020 and no issues to report      Cold sore inner left lip due to increased stress and history.  She has Valtrex 1GM on  hand for outbreaks   She can use OTC Abreva.     GAVIOTA: She is going to revisit using the CPAP again   She is not interested in Inspire.        Symptoms include snoring, night time awakenings, nocturia.        PSG 4/12/23 --> mild GAVIOTA, RDI 3%13.4, RDI 4% 8, OLVIN 0; SpO2 karlee 86%.        She saw Dr Stiles in 8/2023 and continue using CPAP nightly      Vitamin D def:  Continue OTC Vitamin D 2000 UT daily      She had GARCIA and BSO with Dr Killian in 6/19. Bx was precancerous.   She does not need any HRT.   Bladder has mild anterior prolapse and continue Kegels   Mammo was normal in 2/2023.   Breast exam normal 7/2023      BD in 2/2023 T-2.5. Continue Fosamax 70 mg weekly.    Her mother has OP and started having fractures in her late 50's   Her mother is doing Prolia injections.   She has been on Fosamax since 10/2023.   Continue OTC Calcium 600 mg QD (she had constipation with the higher doses of calcium), OTC Vitamin D 2000 UT daily and eat 2 servings of calcium enriched foods daily. Discussed importance of weight bearing exercises      Colonoscopy in 7/17 and normal and Q 10      Ophth:  Her last eye exam was in 1/2022 and sees yearly.   She has early cataracts OD, no glaucoma or MD.   She will have her next eye exam faxed to my office in order to update her medical records.     She has a LW and MPOA.  Recommend she bring a copy of her LW and MPOA in office to have scanned in her chart 7/2023      Hep C 6/18 -    Flu 10/22, 11/23   TDAP 6/5/2020  RSV will hold off this year 2023  Prevnar 20 11/14/2023  Pneumovax never  Shingrix 6/18/2020 and 9/9/2020   Pfizer CVD 3/2021 and 3/2021 booster 11/2021, 10/2022     Some elements in the chart were copied from Dr. Edgar's last office visit with patient. Notes have been updated where appropriate, and reflect my current medical decision making from today.      RTC for iMCR or sooner if needed       Scribe Attestation  By signing my name below, IHyun , Scribe   attest that  this documentation has been prepared under the direction and in the presence of Deepika Edgar MD.

## 2024-05-22 ENCOUNTER — LAB (OUTPATIENT)
Dept: LAB | Facility: LAB | Age: 69
End: 2024-05-22
Payer: MEDICARE

## 2024-05-22 ENCOUNTER — OFFICE VISIT (OUTPATIENT)
Dept: PRIMARY CARE | Facility: CLINIC | Age: 69
End: 2024-05-22
Payer: MEDICARE

## 2024-05-22 VITALS
HEIGHT: 65 IN | SYSTOLIC BLOOD PRESSURE: 138 MMHG | WEIGHT: 205 LBS | BODY MASS INDEX: 34.16 KG/M2 | DIASTOLIC BLOOD PRESSURE: 70 MMHG | TEMPERATURE: 97.4 F | HEART RATE: 64 BPM | OXYGEN SATURATION: 98 %

## 2024-05-22 DIAGNOSIS — G47.33 OSA (OBSTRUCTIVE SLEEP APNEA): ICD-10-CM

## 2024-05-22 DIAGNOSIS — I48.0 PAROXYSMAL ATRIAL FIBRILLATION (MULTI): ICD-10-CM

## 2024-05-22 DIAGNOSIS — I10 BENIGN ESSENTIAL HYPERTENSION: Primary | ICD-10-CM

## 2024-05-22 DIAGNOSIS — E78.2 COMBINED HYPERLIPIDEMIA: ICD-10-CM

## 2024-05-22 DIAGNOSIS — G50.0 TRIGEMINAL NEURALGIA: ICD-10-CM

## 2024-05-22 DIAGNOSIS — M81.0 POSTMENOPAUSAL BONE LOSS: ICD-10-CM

## 2024-05-22 LAB
CHOLEST SERPL-MCNC: 251 MG/DL (ref 0–199)
CHOLESTEROL/HDL RATIO: 3.9
HDLC SERPL-MCNC: 64.5 MG/DL
LDLC SERPL CALC-MCNC: 162 MG/DL
NON HDL CHOLESTEROL: 187 MG/DL (ref 0–149)
TRIGL SERPL-MCNC: 124 MG/DL (ref 0–149)
TSH SERPL-ACNC: 2.16 MIU/L (ref 0.44–3.98)
VLDL: 25 MG/DL (ref 0–40)

## 2024-05-22 PROCEDURE — 3078F DIAST BP <80 MM HG: CPT | Performed by: INTERNAL MEDICINE

## 2024-05-22 PROCEDURE — 1160F RVW MEDS BY RX/DR IN RCRD: CPT | Performed by: INTERNAL MEDICINE

## 2024-05-22 PROCEDURE — 99214 OFFICE O/P EST MOD 30 MIN: CPT | Performed by: INTERNAL MEDICINE

## 2024-05-22 PROCEDURE — 1123F ACP DISCUSS/DSCN MKR DOCD: CPT | Performed by: INTERNAL MEDICINE

## 2024-05-22 PROCEDURE — 3075F SYST BP GE 130 - 139MM HG: CPT | Performed by: INTERNAL MEDICINE

## 2024-05-22 PROCEDURE — 36415 COLL VENOUS BLD VENIPUNCTURE: CPT

## 2024-05-22 PROCEDURE — 1159F MED LIST DOCD IN RCRD: CPT | Performed by: INTERNAL MEDICINE

## 2024-05-22 PROCEDURE — 80061 LIPID PANEL: CPT

## 2024-05-22 PROCEDURE — 1036F TOBACCO NON-USER: CPT | Performed by: INTERNAL MEDICINE

## 2024-05-22 PROCEDURE — 84443 ASSAY THYROID STIM HORMONE: CPT

## 2024-05-22 RX ORDER — DRONEDARONE 400 MG/1
400 TABLET, FILM COATED ORAL
COMMUNITY
Start: 2024-05-03

## 2024-05-23 NOTE — RESULT ENCOUNTER NOTE
Mitchell Verduzco-  The cholesterol is a little higher and will see how the new medication does   Thyroid is good range

## 2024-08-02 DIAGNOSIS — Z12.31 VISIT FOR SCREENING MAMMOGRAM: Primary | ICD-10-CM

## 2024-08-05 ENCOUNTER — APPOINTMENT (OUTPATIENT)
Dept: PRIMARY CARE | Facility: CLINIC | Age: 69
End: 2024-08-05
Payer: MEDICARE

## 2024-08-14 DIAGNOSIS — E78.2 COMBINED HYPERLIPIDEMIA: ICD-10-CM

## 2024-08-14 RX ORDER — EZETIMIBE 10 MG/1
10 TABLET ORAL DAILY
Qty: 90 TABLET | Refills: 2 | Status: SHIPPED | OUTPATIENT
Start: 2024-08-14

## 2024-08-16 ENCOUNTER — APPOINTMENT (OUTPATIENT)
Dept: SLEEP MEDICINE | Facility: CLINIC | Age: 69
End: 2024-08-16
Payer: MEDICARE

## 2024-08-16 DIAGNOSIS — I48.0 PAROXYSMAL ATRIAL FIBRILLATION (MULTI): ICD-10-CM

## 2024-08-16 DIAGNOSIS — G47.33 OSA (OBSTRUCTIVE SLEEP APNEA): Primary | ICD-10-CM

## 2024-08-16 DIAGNOSIS — I10 BENIGN ESSENTIAL HYPERTENSION: ICD-10-CM

## 2024-08-16 PROCEDURE — G2211 COMPLEX E/M VISIT ADD ON: HCPCS | Performed by: GENERAL PRACTICE

## 2024-08-16 PROCEDURE — 1123F ACP DISCUSS/DSCN MKR DOCD: CPT | Performed by: GENERAL PRACTICE

## 2024-08-16 PROCEDURE — 99214 OFFICE O/P EST MOD 30 MIN: CPT | Performed by: GENERAL PRACTICE

## 2024-08-16 ASSESSMENT — SLEEP AND FATIGUE QUESTIONNAIRES
HOW LIKELY ARE YOU TO NOD OFF OR FALL ASLEEP WHILE SITTING QUIETLY AFTER LUNCH WITHOUT ALCOHOL: SLIGHT CHANCE OF DOZING
HOW LIKELY ARE YOU TO NOD OFF OR FALL ASLEEP WHEN YOU ARE A PASSENGER IN A CAR FOR AN HOUR WITHOUT A BREAK: SLIGHT CHANCE OF DOZING
HOW LIKELY ARE YOU TO NOD OFF OR FALL ASLEEP WHILE SITTING AND READING: SLIGHT CHANCE OF DOZING
HOW LIKELY ARE YOU TO NOD OFF OR FALL ASLEEP IN A CAR, WHILE STOPPED FOR A FEW MINUTES IN TRAFFIC: WOULD NEVER DOZE
ESS-CHAD TOTAL SCORE: 5
SITING INACTIVE IN A PUBLIC PLACE LIKE A CLASS ROOM OR A MOVIE THEATER: WOULD NEVER DOZE
HOW LIKELY ARE YOU TO NOD OFF OR FALL ASLEEP WHILE SITTING AND TALKING TO SOMEONE: WOULD NEVER DOZE
HOW LIKELY ARE YOU TO NOD OFF OR FALL ASLEEP WHILE WATCHING TV: SLIGHT CHANCE OF DOZING
HOW LIKELY ARE YOU TO NOD OFF OR FALL ASLEEP WHILE LYING DOWN TO REST IN THE AFTERNOON WHEN CIRCUMSTANCES PERMIT: SLIGHT CHANCE OF DOZING

## 2024-08-16 NOTE — PROGRESS NOTES
Patient: Luba Coates    [id number]  : 1955 -- AGE 68 y.o.    Provider: DO An [facility]   Service Date: 2024              [facility]  Follow up Visit Note    Virtual or Telephone Consent  An interactive audio and video telecommunication system which permits real time communications between the patient (at the originating site) and provider (at the distant site) was utilized to provide this telehealth service.     Verbal consent was requested and obtained from Luba Coates on this date, 24 for a telehealth visit.       HISTORY OF PRESENT ILLNESS     HISTORY OF PRESENT ILLNESS   Luba Coates is a 68 y.o. female who presents to a [facility] for a sleep medicine evaluation with concerns of Follow-up (Discuss getting another rx for a cpap. ).     The patient  has a past medical history of Acute upper respiratory infection, unspecified (2014), Atypical squamous cells of undetermined significance on cytologic smear of cervix (ASC-US), Body mass index (BMI) 32.0-32.9, adult (2020), Cough, unspecified (2014), Encounter for gynecological examination (general) (routine) without abnormal findings (2013), Endometrial intraepithelial neoplasia (EIN) (2019), Personal history of other diseases of the female genital tract (2019), Personal history of other diseases of the musculoskeletal system and connective tissue, Personal history of other diseases of the nervous system and sense organs (2015), Personal history of other diseases of the respiratory system (2015), Personal history of other diseases of the respiratory system (2014), Personal history of other diseases of urinary system, Personal history of other mental and behavioral disorders, Personal history of other specified conditions (2018), Personal history of other specified  conditions, Personal history of other specified conditions (08/07/2013), and Urinary tract infection, site not specified (09/24/2019)..    PAST SLEEP HISTORY     F with pmhx including paroxysmal A-fib, HTN.      Patient was diagnosed with A-fib around 2020 and there is a concern for possible sleep apnea.      Patient has been snoring for years but has not done anything for it.      5/18/23  Patient had a sleep study and would like to discuss her results.      8/10/23  Patient is trying to use pap therapy but has some pain on the left side of cheek due to a dental procedure in the past and cannot tolerate the mask straps touching it.   She is not using pap therapy currently.     CURRENT HISTORY    On today's visit, 8/16/2024, the patient reports that she had to return her pap therapy due to compliance issues.   She would like to restart pap therapy especially in the setting of some heart disease concerns.     Sleep schedule  on weekdays / work days:  Usual Bedtime: varies  Sleep latency: varies  Wake time : varies   Total sleep time average/day: 6 hours/day  Awakenings: varies  Naps: vaires      Sleep schedule  on weekends/non work days :  Same as above     Sleep aids: no  Stimulants: no    Occupation: retired, used to work for the [employer], [employer] job.     Preferred sleeping position: SLEEP POSITION: sidelying    Sleep-related ROS:    Snoring:  y  Witnessed apneas:   n   Gasping/ choking: n       Am Dry mouth:   n          Nasal congestion:   seasonal allergies, zyrtec/ flonase prn      am headaches: n    Sleep is described as refreshing.    Daytime sleepiness: sometimes   Fatigue or decreased energy: sometimes   Difficulty remembering things in daytime: n  Difficulty staying focused in daytime: : n  Irritable during the day: n    Drowsy driving: n  Hx of car accident: n  Near-miss Car accident: n      RLS screen:  RLSSCREEN: - Sensations: Patient does not have unusual sensations in their extremities that  cause an urge to move them     Sleep-related behaviors: DENIES      ESS: 5       REVIEW OF SYSTEMS     REVIEW OF SYSTEMS  Review of Systems   All other systems reviewed and are negative.        ALLERGIES AND MEDICATIONS     ALLERGIES  Allergies   Allergen Reactions    Nebivolol Other     Leg cramps    Statins-Hmg-Coa Reductase Inhibitors Myalgia    Tramadol Anxiety, Psychosis and Nausea/vomiting     hallucinations    Escitalopram Oxalate Nausea/vomiting       MEDICATIONS  Current Outpatient Medications   Medication Sig Dispense Refill    alendronate (Fosamax) 70 mg tablet Take 1 tablet (70 mg) by mouth every 7 days. Take in the morning with a full glass of water, on an empty stomach, and do not take anything else by mouth or lie down for the next 30 min. 12 tablet 3    apixaban (Eliquis) 5 mg tablet Take 1 tablet (5 mg) by mouth 2 times a day. 180 tablet 3    ezetimibe (Zetia) 10 mg tablet TAKE 1 TABLET ONCE DAILY 90 tablet 2    loratadine (Claritin) 10 mg tablet Take 1 tablet (10 mg) by mouth once daily.      Multaq 400 mg tablet Take 1 tablet (400 mg) by mouth 2 times daily (morning and late afternoon).      Tiadylt  mg 24 hr capsule Take 1 capsule (240 mg) by mouth once daily. 90 capsule 3     No current facility-administered medications for this visit.         PAST HISTORY     PAST MEDICAL HISTORY  She  has a past medical history of Acute upper respiratory infection, unspecified (04/08/2014), Atypical squamous cells of undetermined significance on cytologic smear of cervix (ASC-US), Body mass index (BMI) 32.0-32.9, adult (06/05/2020), Cough, unspecified (04/22/2014), Encounter for gynecological examination (general) (routine) without abnormal findings (12/03/2013), Endometrial intraepithelial neoplasia (EIN) (04/23/2019), Personal history of other diseases of the female genital tract (04/23/2019), Personal history of other diseases of the musculoskeletal system and connective tissue, Personal history of  other diseases of the nervous system and sense organs (2015), Personal history of other diseases of the respiratory system (2015), Personal history of other diseases of the respiratory system (2014), Personal history of other diseases of urinary system, Personal history of other mental and behavioral disorders, Personal history of other specified conditions (2018), Personal history of other specified conditions, Personal history of other specified conditions (2013), and Urinary tract infection, site not specified (2019).    PAST SURGICAL HISTORY:  Past Surgical History:   Procedure Laterality Date     SECTION, CLASSIC  2013     Section    OTHER SURGICAL HISTORY  2020    Hysterectomy total abdominal with removal of both ovaries       FAMILY HISTORY  No family history on file.  DOES/DOES NOT EC: does not have a family history of sleep disorder.      SOCIAL HISTORY  She  reports that she has never smoked. She has never used smokeless tobacco. She reports current alcohol use. She reports that she does not use drugs.     Caffeine consumption: 1 cup coffee in am  Alcohol consumption: socially  Smoking: n  Marijuana: n  Other drugs: n      PHYSICAL EXAM     VITAL SIGNS: There were no vitals taken for this visit.     PREVIOUS WEIGHTS:  Wt Readings from Last 3 Encounters:   24 93 kg (205 lb)   24 93.9 kg (207 lb)   24 96.2 kg (212 lb)       Physical Exam  Constitutional: Alert and oriented, cooperative, no obvious distress.   HENT: normocephalic.   Neurologic: AOx3.   psychiatric: appropriate mood and affect.  Integumentary: no significant rashes observed over pap mask area.         RESULTS/DATA     Iron (ug/dL)   Date Value   2019 89     Iron Saturation (%)   Date Value   2019 23 (L)     TIBC (ug/dL)   Date Value   2019 387               ASSESSMENT/PLAN     Ms. Coates is a 68 y.o. 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/23/2019), Personal history of other diseases of the female genital tract (04/23/2019), Personal history of other diseases of the musculoskeletal system and connective tissue, Personal history of other diseases of the nervous system and sense organs (05/29/2015), Personal history of other diseases of the respiratory system (05/29/2015), Personal history of other diseases of the respiratory system (04/25/2014), Personal history of other diseases of urinary system, Personal history of other mental and behavioral disorders, Personal history of other specified conditions (05/28/2018), Personal history of other specified conditions, Personal history of other specified conditions (08/07/2013), and Urinary tract infection, site not specified (09/24/2019). She is following up on sleep apnea.     Problem List Items Addressed This Visit    None      Problem List and Orders     F with pmhx including paroxysmal A-fib, HTN.      1- GAVIOTA  symptoms include snoring, night time awakenings, nocturia.   PSG 4/12/23 --> mild GAVIOTA, RDI 3%13.4, RDI 4% 8, OLVIN 0; SpO2 karlee 86%.      Reviewed and discussed the above sleep study results and management options in details. All questions answered, patient verbalizes understanding.      DME is [facility] but would like to try [facility] now.     -xxxx she was on autopap 5-15cwp, rAHI 0.5, median pressure 5cmH2O, max avg 8.8, poor usage.   -counseled on the importance of compliance  -she does not wish to try an oral appliance or excite.      -She would like to restart pap therapy especially in the setting of some heart disease  concerns.     -ordered sleep study in case needed by insurance.   -ordered new Autopap 5-15cwp    -do not drive or operate heavy machinery if drowsy.  -avoid sleeping on your back.   -avoid sedating substances/ medication, alcohol, illicit drugs and tobacco.     2- Obesity  counseled on eating a healthy diet and exercising as tolerated.       3- HTN  Follow up with pcp/ cardiology    4- paroxysmal A-fib  Follow up with cardiology     f/u 4 months or sooner as needed.

## 2024-08-16 NOTE — PATIENT INSTRUCTIONS
[facility] Sleep Medicine   [facility]  [address] [facility] [address]  [phone]       NAME: Luba Coates   DATE: 8/16/2024     Your Sleep Provider Today: Td Stiles DO  Your Primary Care Physician: Deepika Edgar MD   Your Referring Provider: No ref. provider found    DIAGNOSIS:   1. GAVIOTA (obstructive sleep apnea)  Home sleep apnea test (HSAT)    Positive Airway Pressure (PAP) Therapy          Thank you for coming to the Sleep Medicine Clinic today! Your sleep medicine provider today was: Td Stiles DO Below is a summary of your treatment plan, other important information, and our contact numbers:      TREATMENT PLAN     Follow up in 4 months or sooner as needed.     Instructions - Common GAVIOTA Recs: - For your sleep apnea, continue to use your PAP every night and use it whenever you are sleeping.   - Avoid alcohol or sedatives several hours prior to sleeping.   - Get additional supplies for your PAP (e.g., mask, hose, filters) every 3 months or as your insurance allows from your [employer] company. Replacement cushions for your PAP mask can be requested monthly if airseals are an issue.  - Remember to clean your mask, tubings, and water chamber regularly as instructed.  - Avoid driving or operating heavy machinery when drowsy. A person driving while sleepy is five (5) times more likely to have an accident. If you feel sleepy, pull over and take a short power nap (sleep for less than 30 minutes). Otherwise, ask somebody to drive you.      IMPORTANT INFORMATION     Call 911 for medical emergencies.  Our offices are generally open from Monday-Friday, 9 am - 5 pm.  If you need to get in touch with me, you may either call me and my team(number is below) or you can use [employer].  If a referral for a test, for CPAP, or for another specialist was made, and you have not heard about scheduling this within a week, please call scheduling at [phone]).  If  you are unable to make your appointment for clinic or an overnight study, kindly call the office at least 48 hours in advance to cancel and reschedule.  If you are on CPAP, please bring your device's card or the device to each clinic appointment.   There are no supporting services by either the sleep doctors or their staff on weekends and Holidays, or after 5 PM on weekdays.   If you have been asked to come to a sleep study, make sure you bring toiletries, a comfy pillow, and any nighttime medications that you may regularly take. Also be sure to eat dinner before you arrive. We generally do not provide meals.      PRESCRIPTIONS     We require 7 days advanced notice for prescription refills. If we do not receive the request in this time, we cannot guarantee that your medication will be refilled in time.      IMPORTANT PHONE NUMBERS     Sleep Medicine Clinic Fax: [phone]  Appointments (for Pediatric Sleep Clinic): [phone] - option 1  Appointments (for Adult Sleep Clinic): [phone] - option 2  Appointments (For Sleep Studies): [phone] - option 3  Behavioral Sleep Medicine: [phone]  Sleep Surgery: [phone]  ENT (Otolaryngology): [phone]  Headache Clinic (Neurology): [phone]  Neurology: [phone]  Psychiatry: [phone]  Pulmonary Function Testing (PFT) Center: [phone]  Pulmonary Medicine: [phone]  [employer] (DME): [phone]  [employer] (DME): [phone]  [facility] ([facility]): [phone]      OUR ADULT SLEEP MEDICINE TEAM   Please do not hesitate to call the office or sleep nurse with any questions between appointments:    Adult Sleep Nurses (Capri Ritchie RN and Alissa Ayala RN):  For clinical questions and refilling prescriptions: [phone]  Email sleep diaries and other documents at: [email or web address]    Adult Sleep Medicine Secretaries:  Emerita Aguirre (For  Kaden/Maria L/Jarek/Yovanny/Deanna/Joe):   P: [phone]  F: [phone]  Dorothy Hugo (For Reilly/Guajithenbonnieler): P: [phone]  Fax: [phone]  Kaylin Rushing (For Jurjerrellvic/Blank): P: [phone]  F: [phone]  Palmira Burrows (For Yoshi): P: [phone]  F: [phone]  Shantel Parham (For Starr/Kenia/Zakhary): P: [phone]  F: [phone]  Litzy Vinson (For [place]/Eh): P: [phone]  F: [phone]     Adult Sleep Medicine Advanced Practice Providers:  Dante Centeno ([place], [place])  Carol Ann Aquino ([facility])  Isha Yang CNP ([place], [place], [place])  Rut Tilley CNP ([place], [place], [place])  Sue Belle ([place], [place], [place])  Veronica Stauffer CNP ([place], [place])        OUR SLEEP TESTING LOCATIONS     Our team will contact you to schedule your sleep study, however, you can contact us as follow:  Main Phone Line (scheduling only): [phone]), option 3  Adult and Pediatric Locations  [facility] (6 years and older): [facility] - 4th floor ([address] [place] [address]) After hours line: [phone]  [facility] (Main campus: All ages): [facility], 6th floor. After hours line: [phone]   [place] (5 years and older; younger considered on case-by-case basis): [address], [address]. Scheduling  After hours line: [phone]   [place] (6 years and older): [address]; [address]; [address]   [place] (6 years and older): [address], [address]  After hours line: [phone]   [place] (13 years and older) in [place]: [address], 2nd floor  After hours line: [phone]   [place] (13 year and older): [address], [address]  After hours line: [phone]     Adult Only Locations:   [place] (18 years and older): [address] [facility], 2nd floor   [place] (18 years and older): [address]; 4th floor  After hours line: [phone]   [place] (18 years and older) at [place]: [address] [place] [address]  After hours line: [phone]          CONTACTING YOUR SLEEP MEDICINE PROVIDER     Send a message directly to your provider through \"My Chart\", which is the email service through your  Records Account: [email or web address]   Call [phone] and leave a message. One of the administrative assistants will forward the message to your sleep medicine provider through \"My Chart\" and/or email.     Your sleep medicine provider for this visit was: Td Stiles DO       "

## 2024-08-21 ENCOUNTER — HOSPITAL ENCOUNTER (OUTPATIENT)
Dept: RADIOLOGY | Facility: CLINIC | Age: 69
Discharge: HOME | End: 2024-08-21
Payer: MEDICARE

## 2024-08-21 VITALS — WEIGHT: 205.03 LBS | BODY MASS INDEX: 34.16 KG/M2 | HEIGHT: 65 IN

## 2024-08-21 DIAGNOSIS — Z12.31 VISIT FOR SCREENING MAMMOGRAM: ICD-10-CM

## 2024-08-21 PROCEDURE — 77067 SCR MAMMO BI INCL CAD: CPT

## 2024-08-21 PROCEDURE — 77063 BREAST TOMOSYNTHESIS BI: CPT | Performed by: RADIOLOGY

## 2024-08-21 PROCEDURE — 77067 SCR MAMMO BI INCL CAD: CPT | Performed by: RADIOLOGY

## 2024-08-29 ENCOUNTER — CLINICAL SUPPORT (OUTPATIENT)
Dept: SLEEP MEDICINE | Facility: HOSPITAL | Age: 69
End: 2024-08-29
Payer: MEDICARE

## 2024-08-29 DIAGNOSIS — G47.33 OSA (OBSTRUCTIVE SLEEP APNEA): ICD-10-CM

## 2024-08-29 NOTE — PROGRESS NOTES
Type of Study: HOME SLEEP STUDY - NOMAD     The patient received equipment and instructions for use of the [facility] Nomad HSAT 4047 device. The patient was instructed how to apply the effort belts, cannula, thermistor. It was also explained how the Nomad and oximeter components work.  The patient was asked to record their sleep for an 8-hour period.     The patient was informed of their responsibility for the device and acknowledged this by signing the HSAT device contract. The patient was asked to return the device on 8/30/2024 between the hours of 9am to the Sleep Center.     The patient was instructed to call 911 as usual for any medical- emergencies while at home.  The patient was also given a phone number for troubleshooting when using the device in case there were additional questions.

## 2024-09-08 DIAGNOSIS — E78.2 COMBINED HYPERLIPIDEMIA: Primary | ICD-10-CM

## 2024-09-08 DIAGNOSIS — I48.0 PAROXYSMAL ATRIAL FIBRILLATION (MULTI): ICD-10-CM

## 2024-09-11 ENCOUNTER — APPOINTMENT (OUTPATIENT)
Dept: PRIMARY CARE | Facility: CLINIC | Age: 69
End: 2024-09-11
Payer: MEDICARE

## 2024-09-30 ENCOUNTER — APPOINTMENT (OUTPATIENT)
Dept: SLEEP MEDICINE | Facility: CLINIC | Age: 69
End: 2024-09-30
Payer: MEDICARE

## 2024-10-04 ENCOUNTER — PATIENT MESSAGE (OUTPATIENT)
Facility: CLINIC | Age: 69
End: 2024-10-04
Payer: MEDICARE

## 2024-10-18 PROBLEM — E66.811 CLASS 1 OBESITY WITH BODY MASS INDEX (BMI) OF 34.0 TO 34.9 IN ADULT: Status: ACTIVE | Noted: 2024-02-19

## 2024-10-31 DIAGNOSIS — I10 BENIGN ESSENTIAL HYPERTENSION: ICD-10-CM

## 2024-10-31 DIAGNOSIS — I48.0 PAROXYSMAL ATRIAL FIBRILLATION (MULTI): ICD-10-CM

## 2024-11-01 RX ORDER — DILTIAZEM HYDROCHLORIDE 240 MG/1
240 CAPSULE, EXTENDED RELEASE ORAL DAILY
Qty: 90 CAPSULE | Refills: 3 | Status: SHIPPED | OUTPATIENT
Start: 2024-11-01

## 2024-11-04 ENCOUNTER — LAB (OUTPATIENT)
Dept: LAB | Facility: LAB | Age: 69
End: 2024-11-04
Payer: MEDICARE

## 2024-11-04 DIAGNOSIS — E78.2 COMBINED HYPERLIPIDEMIA: ICD-10-CM

## 2024-11-04 LAB
ALBUMIN SERPL BCP-MCNC: 4.2 G/DL (ref 3.4–5)
ALP SERPL-CCNC: 115 U/L (ref 33–136)
ALT SERPL W P-5'-P-CCNC: 26 U/L (ref 7–45)
ANION GAP SERPL CALC-SCNC: 13 MMOL/L (ref 10–20)
AST SERPL W P-5'-P-CCNC: 22 U/L (ref 9–39)
BILIRUB SERPL-MCNC: 0.5 MG/DL (ref 0–1.2)
BUN SERPL-MCNC: 12 MG/DL (ref 6–23)
CALCIUM SERPL-MCNC: 8.9 MG/DL (ref 8.6–10.6)
CHLORIDE SERPL-SCNC: 103 MMOL/L (ref 98–107)
CHOLEST SERPL-MCNC: 249 MG/DL (ref 0–199)
CHOLESTEROL/HDL RATIO: 3.6
CO2 SERPL-SCNC: 29 MMOL/L (ref 21–32)
CREAT SERPL-MCNC: 0.79 MG/DL (ref 0.5–1.05)
EGFRCR SERPLBLD CKD-EPI 2021: 83 ML/MIN/1.73M*2
ERYTHROCYTE [DISTWIDTH] IN BLOOD BY AUTOMATED COUNT: 14.5 % (ref 11.5–14.5)
GLUCOSE SERPL-MCNC: 92 MG/DL (ref 74–99)
HCT VFR BLD AUTO: 42.9 % (ref 36–46)
HDLC SERPL-MCNC: 68.9 MG/DL
HGB BLD-MCNC: 14.1 G/DL (ref 12–16)
LDLC SERPL CALC-MCNC: 149 MG/DL
MCH RBC QN AUTO: 28.5 PG (ref 26–34)
MCHC RBC AUTO-ENTMCNC: 32.9 G/DL (ref 32–36)
MCV RBC AUTO: 87 FL (ref 80–100)
NON HDL CHOLESTEROL: 180 MG/DL (ref 0–149)
NRBC BLD-RTO: 0 /100 WBCS (ref 0–0)
PLATELET # BLD AUTO: 338 X10*3/UL (ref 150–450)
POTASSIUM SERPL-SCNC: 4.3 MMOL/L (ref 3.5–5.3)
PROT SERPL-MCNC: 6.9 G/DL (ref 6.4–8.2)
RBC # BLD AUTO: 4.95 X10*6/UL (ref 4–5.2)
SODIUM SERPL-SCNC: 141 MMOL/L (ref 136–145)
TRIGL SERPL-MCNC: 154 MG/DL (ref 0–149)
TSH SERPL-ACNC: 2.97 MIU/L (ref 0.44–3.98)
VLDL: 31 MG/DL (ref 0–40)
WBC # BLD AUTO: 5.7 X10*3/UL (ref 4.4–11.3)

## 2024-11-04 PROCEDURE — 80061 LIPID PANEL: CPT

## 2024-11-04 PROCEDURE — 36415 COLL VENOUS BLD VENIPUNCTURE: CPT

## 2024-11-04 PROCEDURE — 84443 ASSAY THYROID STIM HORMONE: CPT

## 2024-11-04 PROCEDURE — 80053 COMPREHEN METABOLIC PANEL: CPT

## 2024-11-04 PROCEDURE — 85027 COMPLETE CBC AUTOMATED: CPT

## 2024-11-05 NOTE — RESULT ENCOUNTER NOTE
Mitchell Verduzco-  The cholesterol remains high level  LDL and would like to get <100  Thyroid is normal   Rest of the labs are good range

## 2024-11-06 NOTE — PROGRESS NOTES
Subjective   Reason for Visit: Luba Coates is an 68 y.o. female here for her Initial Medicare Assessment and follow up          Her mother fell this past weekend 11/24. She fractured T7 and T12-  severe fracture with 50 % compression     She has increased LE edema a couple weeks ago from being on her feet a lot  She is not wearing her support stockings   She is taking furosemide now prn   She usually takes furosemide weekly     She has an episode of a- fib on 11/5/24 but she thinks it is from her increased stressors   She is monitoring her BP at home and systolic runs around 120    She has an appt 11/26/24 with a new supply company and she should be able to get her CPAP   She is hoping she will be able to tolerate the CPAP       HEALTH:   PAP 2013, 5/17, TRH/BSO in 6/19 and no longer needs to repeat   - D/C 4/19 + polyp -   Mammo 7/17, 6/18, 7/20, 2/22, 2/23, 8/24  BD 6/18 T-1.7, 2/23 T-2.5   --  started fosamax in 10/2023   -- Her mother has OP and is doing Prolia injections.    Colon 7/17 normal and Q 10   Ca+ cardiac score 6/18 was Zero  EKG  2/20, 10/22, 7/23, 11/23, 4/24  cardio  Urine 5/18,  Hep C 6/18 -    Flu 10/22, 11/23   TDAP 6/5/2020  RSV at age 77   Prevnar 20 11/14/2023  Pneumovax never  Shingrix 6/18/2020 and 9/9/2020   Pfizer CVD 3/21 and 3/21 booster 11/21, 10/22  Ophth Her last eye exam was in 1/2022. She has early cataracts OD, no glaucoma or MD.      Patient Care Team:  Deepika Edgar MD as PCP - General  Deepika Edgar MD as PCP - [facility] Attributed Provider     Review of Systems  All systems negative except those listed in the HPI      Past Medical, Surgical, and Family History reviewed and updated in chart.  Reviewed all medications by prescribing practitioner or clinical pharmacist   (such as prescriptions, OTCs, herbal therapies and supplements) and documented in the medical record      Objective   Vitals:  Visit Vitals  /70 (BP Location: Left arm, Patient Position: Sitting,  BP Cuff Size: Adult)   Pulse 73   Temp 36.4 °C (97.5 °F) (Temporal)   Resp 14    Body mass index is 33.71 kg/m².     Visit Vitals  /68   Pulse 73   Temp 36.4 °C (97.5 °F) (Temporal)   Resp 14    Recheck BP by Dr Deepika Edgar MD 11/7/24     Physical Exam  Vitals reviewed.   Constitutional:       Appearance: Normal appearance. She is obese.   HENT:      Head: Normocephalic.      Right Ear: Tympanic membrane, ear canal and external ear normal.      Left Ear: Tympanic membrane, ear canal and external ear normal.      Nose: Nose normal.      Mouth/Throat:      Pharynx: Oropharynx is clear.      Comments: Cold sore inner left lip   Eyes:      Conjunctiva/sclera: Conjunctivae normal.   Cardiovascular:      Rate and Rhythm: Normal rate and regular rhythm.      Pulses: Normal pulses.      Heart sounds: Normal heart sounds.      Comments: Sinus, not in a- fib  Pulmonary:      Effort: Pulmonary effort is normal.      Breath sounds: Normal breath sounds.   Abdominal:      General: Bowel sounds are normal.      Palpations: Abdomen is soft.      Comments: Large abdominal girth    Musculoskeletal:         General: Normal range of motion.      Cervical back: Normal range of motion and neck supple.   Skin:     General: Skin is warm.   Neurological:      General: No focal deficit present.      Mental Status: She is alert and oriented to person, place, and time.   Psychiatric:         Mood and Affect: Mood normal.         Behavior: Behavior normal.         Thought Content: Thought content normal.         Judgment: Judgment normal.       Assessment & Plan       Problem List Items Addressed This Visit       Benign essential hypertension    Combined hyperlipidemia    Mild aortic insufficiency    GAVIOTA (obstructive sleep apnea)    Paroxysmal atrial fibrillation (Multi)    Trigeminal neuralgia     Other Visit Diagnoses       Routine general medical examination at health care facility    -  Primary    Relevant Orders    1 Year Follow  Up In Advanced Primary Care - PCP - Wellness Exam    Age-related osteoporosis without current pathological fracture               Initial Medicare Assessment and follow up completed   Reviewed her labs from 11/24       Medicare Wellness completed  -  Discussed healthy diet and regular exercise.    -  Physical exam overall unremarkable. Immunizations reviewed and updated accordingly. Healthy lifestyle choices discussed (tobacco avoidance, appropriate alcohol use, avoidance of illicit substances).   -  Patient is wearing seatbelt.   -  Screening lab work ordered as indicated.    -  Age appropriate screening tests reviewed with patient.        We spent 15 minutes discussing depression screen and there is nothing found that is of concern for underling depression. The PQH form was filled and the meds reviewed.   No depression to report      We spent 15 minutes discussing alcohol use and there are no concerns about overuse. The 15 min was spent in going over any issues of use of alcohol.   She drinks EtOH socially only      She has grab bars in the shower.    She has not fallen recently and no risk of falls in the house   She has good lighting around the house and functioning smoke detectors.      She is  with 1 daughter. She denies previous history of tobacco use  Her dad passed away in 11/18 from dementia. Her mother had a CVA   She has been taking care of her  who has Myasthenia Gravis    She also has a friend that relies on her for quite a bit.       Her weight in office is 202 with BMI of 33.71. We spent 15 minutes discussing diet and exercise. The struggle of weight loss persists   I would like to see her BMI below 30.   Recommend she look into a plant based/ whole foods diet       A-fib diagnosed 2/5/2020: She has an episode of a- fib on 11/5/24 but she thinks it is from her increased stressors. The Multaq controls her a- fib well   Continue Tiadylt 240 mg daily and Multaq 400 mg BID   Continue  Eliquis 5 mg BID     Stress test, ECHO, and testing was normal no valve disease or clot 2/2020.   Holter monitor in 2/2020, she was in normal sinus rhythm, there was no atrial fibrillation. No significant tachycardia.   She saw Dr Buchanan in 4/2024       HTN : Stable   Continue Tiadylt 240 mg daily  She takes an extra propafenone if she is in a-fib   She failed Bystolic due to diarrhea   Palpitations were related to alcohol intake as well  EKG 4/24 sinus with cardiology   Recommend she monitor her BP at home and call with elevated readings   She saw Dr Buchanan in 4/2023      I have spent 15 min face to face with this patient discussing their cardiac risk   We discussed the patients cardiovascular risk. If needed, lifestyle modifications recommended including: behavioral therapies of nutrition choices, exercise and eliminate habits that are contributing to their cardiac risk. We agreed to a plan to decrease his cardiovascular risks. Discussed ASA. Reviewed Guidelines and approved recommendations made to patient.   The patient's 10 yr CV risk was estimated at  9.8 % 11/2024      HLD:  and HDL 68 on labs in 11/2024.    Explained goal for LDL to be less than 100 and ideally less than 70    Recommend she limit dairy intake   Continue Zetia 20 mg daily   Discontinued simvastatin due to LE weakness   Discontinued Crestor due to aching and nightmares  She stopped pravastatin due to developing a rash.  She failed Livalo due to cost     Ca cardiac score in 10/2021 score of ZERO, unchanged 1.6 cm low density left hepatic lobe lesion may represent a cyst   Offered patient referral to dietician, she declines  Recommend she look into a plant based/ whole foods diet      Cervicogenic HA:   Seen in the ED 2/15/2024 for HA.   CT without acute findings. CMP, CBC, magnesium without significant abnormalities.   Nothing found and patient was discharged       BPPV: Stable   When her ears get congested she has vertigo Sx   Continue Epley  maneuver prn      BLE :    She is taking furosemide prn. She usually takes furosemide weekly   Recommend she begin wearing support stockings daily   Elevate legs 45 minutes at night   Monitor daily sodium intake      Mild situational depression: Her mother fell this past weekend 11/24. She fractured T7 and T12-  severe fracture with 50 % compression. Her mother is 91 y/o   She moved her mother to assisted living. Her mother is 89 y/o.   Her mother's cognitive ability has decreased greatly    She has no family around to help her with her mother.   She has a friend that relies on her quite a bit    She takes care of her  with his health issues    She is mentally and physically exhausted from taking care of others  Her father passed away in 11/18.    Recommend she speak with a therapist. She declines.   Explained importance of her taking breaks for herself to help her cope   She failed Lexapro due to nausea and fatigue and weaned off.  She declines medication      Intermittent left knee pain:   She had improvement in this condition with the chiropractor       She saw dermatology in 2020 and no issues to report      Cold sore inner left lip due to increased stress and history.  She has Valtrex 1GM on hand for outbreaks   She can use OTC Abreva.     GAVIOTA: She has an appt 11/26/24 with a new supply company and she should be able to get her CPAP. She is hoping she will be able to tolerate the CPAP   She is not interested in [employer].       Symptoms include snoring, night time awakenings, nocturia.       PSG 4/12/23 --> mild GAVIOTA, RDI 3%13.4, RDI 4% 8, OLVIN 0; SpO2 karlee 86%.       She saw Dr Stiles in 8/24 and restart CPAP      Vitamin D def:  Continue OTC Vitamin D3 2000 UT daily      S/p GARCIA and BSO with Dr Killian in 6/19. Bx was precancerous.   Bladder has mild anterior prolapse and continue Kegels     Mammo was normal in 8/24  Breast exam normal 11/24      BD in 2/2023 T-2.5. Continue Fosamax 70 mg weekly.    She  has been on Fosamax since 10/2023.    Her mother has OP and is doing Prolia injections.   Continue OTC Calcium 600 mg QD (she had constipation with the higher doses of calcium), OTC Vitamin D 2000 UT daily and eat 2 servings of calcium enriched foods daily. Discussed importance of weight bearing exercises      Colonoscopy in 7/17 and normal and Q 10      Ophth:  Her last eye exam was in 1/2022 and sees yearly.   She has early cataracts OD, no glaucoma or MD.   She will have her next eye exam faxed to my office in order to update her medical records.     I spent 15 min with the patient discussing their wishes for end of life choices.   We discussed the need for a Living Will and that wishes should be discussed with Family. The DNR status was reviewed, and we discussed the options of this and, the DNR _CC options as well.   We also went over how important it was to have these choices written down and clear for any surviving family so that their wishes are followed   The patient and I came to to following agreement :   She does not have a LW and MPOA. Explained this forms to patient in detail 11/24. She can print the forms from the Internet, complete and have notarized   Her daughter is going to be her primary MPOA and her  will be her secondary  Recommend she bring a copy of her LW and MPOA in office to have scanned in her chart 11/24     Hep C 6/18 -    Flu 10/22, 11/23   TDAP 6/5/2020  RSV at age 77    Prevnar 20 11/14/2023  Pneumovax never  Shingrix 6/18/2020 and 9/9/2020   Pfizer CVD 3/21 and 3/21 booster 11/21, 10/22     Some elements in the chart were copied from Dr. Edgar's last office visit with patient. Notes have been updated where appropriate, and reflect my current medical decision making from today.      RTC in 1 year for MCR or sooner if needed    (MCR due 11/25, last mcr 11/7/24)      Scribe Attestation  By signing my name below, IHyun , Scribe   attest that this documentation has been  prepared under the direction and in the presence of Deepika Edgar MD.

## 2024-11-07 ENCOUNTER — APPOINTMENT (OUTPATIENT)
Dept: CARDIOLOGY | Facility: CLINIC | Age: 69
End: 2024-11-07
Payer: MEDICARE

## 2024-11-07 ENCOUNTER — OFFICE VISIT (OUTPATIENT)
Dept: PRIMARY CARE | Facility: CLINIC | Age: 69
End: 2024-11-07
Payer: MEDICARE

## 2024-11-07 VITALS
WEIGHT: 202.6 LBS | HEIGHT: 65 IN | SYSTOLIC BLOOD PRESSURE: 132 MMHG | RESPIRATION RATE: 14 BRPM | HEART RATE: 73 BPM | DIASTOLIC BLOOD PRESSURE: 68 MMHG | TEMPERATURE: 97.5 F | BODY MASS INDEX: 33.76 KG/M2 | OXYGEN SATURATION: 99 %

## 2024-11-07 VITALS
DIASTOLIC BLOOD PRESSURE: 66 MMHG | BODY MASS INDEX: 33.66 KG/M2 | HEIGHT: 65 IN | HEART RATE: 75 BPM | WEIGHT: 202 LBS | OXYGEN SATURATION: 97 % | SYSTOLIC BLOOD PRESSURE: 130 MMHG

## 2024-11-07 DIAGNOSIS — M81.0 AGE-RELATED OSTEOPOROSIS WITHOUT CURRENT PATHOLOGICAL FRACTURE: ICD-10-CM

## 2024-11-07 DIAGNOSIS — I35.1 MILD AORTIC INSUFFICIENCY: ICD-10-CM

## 2024-11-07 DIAGNOSIS — M15.8 DEGENERATIVE ARTHRITIS OF INTERPHALANGEAL JOINT OF LEFT THUMB: ICD-10-CM

## 2024-11-07 DIAGNOSIS — Z00.00 ROUTINE GENERAL MEDICAL EXAMINATION AT HEALTH CARE FACILITY: Primary | ICD-10-CM

## 2024-11-07 DIAGNOSIS — G47.33 OSA (OBSTRUCTIVE SLEEP APNEA): ICD-10-CM

## 2024-11-07 DIAGNOSIS — I48.0 PAROXYSMAL ATRIAL FIBRILLATION (MULTI): Primary | ICD-10-CM

## 2024-11-07 DIAGNOSIS — I10 BENIGN ESSENTIAL HYPERTENSION: ICD-10-CM

## 2024-11-07 DIAGNOSIS — E78.2 COMBINED HYPERLIPIDEMIA: ICD-10-CM

## 2024-11-07 DIAGNOSIS — G50.0 TRIGEMINAL NEURALGIA: ICD-10-CM

## 2024-11-07 DIAGNOSIS — I48.0 PAROXYSMAL ATRIAL FIBRILLATION (MULTI): ICD-10-CM

## 2024-11-07 PROBLEM — E66.811 CLASS 1 OBESITY WITH BODY MASS INDEX (BMI) OF 34.0 TO 34.9 IN ADULT: Status: RESOLVED | Noted: 2024-02-19 | Resolved: 2024-11-07

## 2024-11-07 PROCEDURE — G0446 INTENS BEHAVE THER CARDIO DX: HCPCS | Performed by: INTERNAL MEDICINE

## 2024-11-07 PROCEDURE — 99215 OFFICE O/P EST HI 40 MIN: CPT | Performed by: STUDENT IN AN ORGANIZED HEALTH CARE EDUCATION/TRAINING PROGRAM

## 2024-11-07 PROCEDURE — 1123F ACP DISCUSS/DSCN MKR DOCD: CPT | Performed by: STUDENT IN AN ORGANIZED HEALTH CARE EDUCATION/TRAINING PROGRAM

## 2024-11-07 PROCEDURE — 1159F MED LIST DOCD IN RCRD: CPT | Performed by: INTERNAL MEDICINE

## 2024-11-07 PROCEDURE — 1158F ADVNC CARE PLAN TLK DOCD: CPT | Performed by: INTERNAL MEDICINE

## 2024-11-07 PROCEDURE — 3008F BODY MASS INDEX DOCD: CPT | Performed by: STUDENT IN AN ORGANIZED HEALTH CARE EDUCATION/TRAINING PROGRAM

## 2024-11-07 PROCEDURE — 3075F SYST BP GE 130 - 139MM HG: CPT | Performed by: INTERNAL MEDICINE

## 2024-11-07 PROCEDURE — 1123F ACP DISCUSS/DSCN MKR DOCD: CPT | Performed by: INTERNAL MEDICINE

## 2024-11-07 PROCEDURE — 99214 OFFICE O/P EST MOD 30 MIN: CPT | Performed by: INTERNAL MEDICINE

## 2024-11-07 PROCEDURE — 3075F SYST BP GE 130 - 139MM HG: CPT | Performed by: STUDENT IN AN ORGANIZED HEALTH CARE EDUCATION/TRAINING PROGRAM

## 2024-11-07 PROCEDURE — 1036F TOBACCO NON-USER: CPT | Performed by: STUDENT IN AN ORGANIZED HEALTH CARE EDUCATION/TRAINING PROGRAM

## 2024-11-07 PROCEDURE — 93000 ELECTROCARDIOGRAM COMPLETE: CPT | Performed by: STUDENT IN AN ORGANIZED HEALTH CARE EDUCATION/TRAINING PROGRAM

## 2024-11-07 PROCEDURE — 1160F RVW MEDS BY RX/DR IN RCRD: CPT | Performed by: INTERNAL MEDICINE

## 2024-11-07 PROCEDURE — 3078F DIAST BP <80 MM HG: CPT | Performed by: INTERNAL MEDICINE

## 2024-11-07 PROCEDURE — 1036F TOBACCO NON-USER: CPT | Performed by: INTERNAL MEDICINE

## 2024-11-07 PROCEDURE — G0438 PPPS, INITIAL VISIT: HCPCS | Performed by: INTERNAL MEDICINE

## 2024-11-07 PROCEDURE — 1159F MED LIST DOCD IN RCRD: CPT | Performed by: STUDENT IN AN ORGANIZED HEALTH CARE EDUCATION/TRAINING PROGRAM

## 2024-11-07 PROCEDURE — 3078F DIAST BP <80 MM HG: CPT | Performed by: STUDENT IN AN ORGANIZED HEALTH CARE EDUCATION/TRAINING PROGRAM

## 2024-11-07 PROCEDURE — 3008F BODY MASS INDEX DOCD: CPT | Performed by: INTERNAL MEDICINE

## 2024-11-07 PROCEDURE — 1170F FXNL STATUS ASSESSED: CPT | Performed by: INTERNAL MEDICINE

## 2024-11-07 RX ORDER — FUROSEMIDE 20 MG/1
20 TABLET ORAL DAILY PRN
COMMUNITY
Start: 2024-10-26

## 2024-11-07 RX ORDER — CLOBETASOL PROPIONATE 0.5 MG/G
1 CREAM TOPICAL 2 TIMES DAILY
COMMUNITY
Start: 2024-06-18

## 2024-11-07 RX ORDER — LOSARTAN POTASSIUM 25 MG/1
25 TABLET ORAL DAILY
Qty: 30 TABLET | Refills: 11 | Status: SHIPPED | OUTPATIENT
Start: 2024-11-07 | End: 2025-11-07

## 2024-11-07 ASSESSMENT — ACTIVITIES OF DAILY LIVING (ADL)
TAKING_MEDICATION: INDEPENDENT
DOING_HOUSEWORK: INDEPENDENT
MANAGING_FINANCES: INDEPENDENT
DRESSING: INDEPENDENT
GROCERY_SHOPPING: INDEPENDENT
BATHING: INDEPENDENT

## 2024-11-07 ASSESSMENT — ENCOUNTER SYMPTOMS
LOSS OF SENSATION IN FEET: 0
DEPRESSION: 0
OCCASIONAL FEELINGS OF UNSTEADINESS: 0

## 2024-11-07 NOTE — PROGRESS NOTES
"    Cardiac Electrophysiology Office Visit     Referred by Dr. Taylor ref. provider found for   No chief complaint on file.     HPI:  Luba Coates is a 68 y.o. year old female patient with h/o paroxysmal atrial fibrillation (on Eliquis and propafenone, diltiazem), HLD (multiple statins tried but not able to tolerate due to myalgias) presenting today for follow-up    PMHx/PSHx: As above  Tobacco never, alcohol - social, caffeine use 1 cup/day, drug use - denies  FamHx: Father - AF in 70s, Mother - AF in her 90s,     Objective  Allergies   Allergen Reactions    Nebivolol Other     Leg cramps    Statins-Hmg-Coa Reductase Inhibitors Myalgia    Tramadol Anxiety, Psychosis and Nausea/vomiting     hallucinations    Escitalopram Oxalate Nausea/vomiting      Current Outpatient Medications   Medication Instructions    alendronate (FOSAMAX) 70 mg, oral, Every 7 days, Take in the morning with a full glass of water, on an empty stomach, and do not take anything else by mouth or lie down for the next 30 min.    apixaban (ELIQUIS) 5 mg, oral, 2 times daily    clobetasol (Temovate) 0.05 % cream 1 Application, 2 times daily    ezetimibe (ZETIA) 10 mg, oral, Daily    furosemide (LASIX) 20 mg, Daily PRN    loratadine (CLARITIN) 10 mg, Daily    Multaq 400 mg, 2 times daily (morning and late afternoon)    Tiadylt  mg, oral, Daily      Visit Vitals  /66 (BP Location: Left arm, Patient Position: Sitting)   Pulse 75   Ht 1.651 m (5' 5\")   Wt 91.6 kg (202 lb)   SpO2 97%   BMI 33.61 kg/m²   OB Status Hysterectomy   Smoking Status Never   BSA 2.05 m²      Physical Exam  Vitals reviewed.   Constitutional:       Appearance: Normal appearance.   HENT:      Head: Normocephalic.   Cardiovascular:      Rate and Rhythm: Normal rate and regular rhythm.   Pulmonary:      Effort: Pulmonary effort is normal. No respiratory distress.      Breath sounds: No wheezing.   Skin:     General: Skin is warm and dry.      Capillary Refill: Capillary " refill takes less than 2 seconds.   Neurological:      Mental Status: She is alert.   Psychiatric:         Mood and Affect: Mood normal.        My Interpretation of Reviewed Study(s):  Coronary calcium score 10/7/2021: Total score of 0. Ascending aorta normal dimension.  Echo (Feb 2020): EF 50 to 55%. Mild concentric LVH. Grade 2 diastolic dysfunction. Mild AR, MR, and TR. RVSP 34 mmHg.  Stress echo (Feb 2020): EF at rest 55% increasing to 65% postexercise. No evidence of ischemia.  6-day cardiac monitor (Feb 2020): Sinus rhythm with PACs and PVCs along with 3 episodes of atrial tachycardia with the longest and fastest episode of 4 beats at 151 bpm.     Assessment/Plan   # Paroxysmal atrial fibrillation  AF Dx History: July 2020; h/o Cardioversion: No; AAD Use:  Propafenone 150mg BID ; Anticoagulation use: Apixaban 5mg BID (current); h/o Ablation: None; [id number] Score: 3  Pt still notes about 2-3 episodes a month.   Pt did increase her Propafenone. She has some contact dermatitis and was on antihistamine. Most recent more episodes of AF. Aug 8th AF lasting 2 hours.  Patient was having more frequent episodes and eventually was switched to dronedarone by her cardiologist (provider who works with her daughter).  Underneath patient reports that the episodes atrial fibrillation been less frequent and have improved and duration as well.  c/w AC: Apixaban 5mg BID  c/w Dronedarone 400mg BID   Stop Diltiazem due to concern increasing efficacy of Multaq    #Dronedarone (Multaq) - High risk medications  Labs:  Recent Labs     11/04/24  0940 02/15/24  1039 05/18/23  1407 06/22/22  1025 06/18/21  1010   AST 22 18 21 19 18   ALT 26 19 23 18 17     Follow up:  LFTs (Within 6 months of initiation): Dated 11/4; Within normal limits / No change compared to prior  ECG (Every 6 - 12 months): ECG Dated 11/4; QT/Qtc Stable  Continue with Dronedarone 400mg twice a day      #HTN  Pt is coming off Diltiazem, so will try low dose ARB.  Renal function is intact  Start Losartan 25mg Daily    #HLD  Has not tolerated statin therapy  Pt will discuss with her cardiologist about Repatha, since her lifestyle changes have not helped fix it.      Return to Clinic: Patient should return to the EP Clinic in 6 months    Kiko Buchanan MD [facility]  Cardiac Electrophysiology  [email or web address]    **Disclaimer: This note was dictated by speech recognition, and every effort has been made to prevent any error in transcription, however minor errors may be present**

## 2024-11-07 NOTE — PROGRESS NOTES
"Subjective   Reason for Visit: Luba Coates is an 68 y.o. female here for a Medicare Wellness visit.     Past Medical, Surgical, and Family History reviewed and updated in chart.    Reviewed all medications by prescribing practitioner or clinical pharmacist (such as prescriptions, OTCs, herbal therapies and supplements) and documented in the medical record.    HPI    Patient Care Team:  Deepika Edgar MD as PCP - General  Deepika Edgar MD as PCP - [facility] Attributed Provider     Review of Systems    Objective   Vitals:  /70 (BP Location: Left arm, Patient Position: Sitting, BP Cuff Size: Adult)   Pulse 73   Temp 36.4 °C (97.5 °F) (Temporal)   Resp 14   Ht 1.651 m (5' 5\")   Wt 91.9 kg (202 lb 9.6 oz)   SpO2 99%   BMI 33.71 kg/m²       Physical Exam    Assessment & Plan  Routine general medical examination at health care facility    Orders:  •  1 Year Follow Up In [facility] - PCP - Wellness Exam; Future              "

## 2024-11-26 DIAGNOSIS — I10 BENIGN ESSENTIAL HYPERTENSION: ICD-10-CM

## 2024-11-27 RX ORDER — LOSARTAN POTASSIUM 25 MG/1
25 TABLET ORAL DAILY
Qty: 90 TABLET | Refills: 3 | Status: SHIPPED | OUTPATIENT
Start: 2024-11-27 | End: 2025-11-27

## 2024-12-04 ENCOUNTER — APPOINTMENT (OUTPATIENT)
Dept: PRIMARY CARE | Facility: CLINIC | Age: 69
End: 2024-12-04
Payer: MEDICARE

## 2024-12-10 ENCOUNTER — PATIENT MESSAGE (OUTPATIENT)
Facility: CLINIC | Age: 69
End: 2024-12-10
Payer: MEDICARE

## 2024-12-12 ENCOUNTER — APPOINTMENT (OUTPATIENT)
Dept: SLEEP MEDICINE | Facility: CLINIC | Age: 69
End: 2024-12-12
Payer: MEDICARE

## 2024-12-17 ENCOUNTER — PATIENT MESSAGE (OUTPATIENT)
Facility: CLINIC | Age: 69
End: 2024-12-17
Payer: MEDICARE

## 2024-12-17 DIAGNOSIS — G47.33 OSA (OBSTRUCTIVE SLEEP APNEA): Primary | ICD-10-CM

## 2024-12-26 DIAGNOSIS — M81.0 AGE-RELATED OSTEOPOROSIS WITHOUT CURRENT PATHOLOGICAL FRACTURE: ICD-10-CM

## 2024-12-26 RX ORDER — ALENDRONATE SODIUM 70 MG/1
TABLET ORAL
Qty: 12 TABLET | Refills: 3 | Status: SHIPPED | OUTPATIENT
Start: 2024-12-26

## 2025-01-06 DIAGNOSIS — I48.0 PAROXYSMAL ATRIAL FIBRILLATION (MULTI): Primary | ICD-10-CM

## 2025-01-06 RX ORDER — DRONEDARONE 400 MG/1
400 TABLET, FILM COATED ORAL
Qty: 180 TABLET | Refills: 3 | Status: SHIPPED | OUTPATIENT
Start: 2025-01-06

## 2025-01-09 ENCOUNTER — APPOINTMENT (OUTPATIENT)
Facility: CLINIC | Age: 70
End: 2025-01-09
Payer: MEDICARE

## 2025-01-09 VITALS
SYSTOLIC BLOOD PRESSURE: 148 MMHG | OXYGEN SATURATION: 97 % | HEART RATE: 80 BPM | DIASTOLIC BLOOD PRESSURE: 77 MMHG | BODY MASS INDEX: 34.19 KG/M2 | HEIGHT: 65 IN | RESPIRATION RATE: 18 BRPM | WEIGHT: 205.2 LBS

## 2025-01-09 DIAGNOSIS — G47.33 OSA (OBSTRUCTIVE SLEEP APNEA): Primary | ICD-10-CM

## 2025-01-09 DIAGNOSIS — I48.0 PAROXYSMAL ATRIAL FIBRILLATION (MULTI): ICD-10-CM

## 2025-01-09 DIAGNOSIS — I10 BENIGN ESSENTIAL HYPERTENSION: ICD-10-CM

## 2025-01-09 PROCEDURE — 3008F BODY MASS INDEX DOCD: CPT | Performed by: GENERAL PRACTICE

## 2025-01-09 PROCEDURE — 1036F TOBACCO NON-USER: CPT | Performed by: GENERAL PRACTICE

## 2025-01-09 PROCEDURE — 1123F ACP DISCUSS/DSCN MKR DOCD: CPT | Performed by: GENERAL PRACTICE

## 2025-01-09 PROCEDURE — 1159F MED LIST DOCD IN RCRD: CPT | Performed by: GENERAL PRACTICE

## 2025-01-09 PROCEDURE — 3078F DIAST BP <80 MM HG: CPT | Performed by: GENERAL PRACTICE

## 2025-01-09 PROCEDURE — G2211 COMPLEX E/M VISIT ADD ON: HCPCS | Performed by: GENERAL PRACTICE

## 2025-01-09 PROCEDURE — 3077F SYST BP >= 140 MM HG: CPT | Performed by: GENERAL PRACTICE

## 2025-01-09 PROCEDURE — 99214 OFFICE O/P EST MOD 30 MIN: CPT | Performed by: GENERAL PRACTICE

## 2025-01-09 ASSESSMENT — ENCOUNTER SYMPTOMS
DEPRESSION: 0
OCCASIONAL FEELINGS OF UNSTEADINESS: 0
LOSS OF SENSATION IN FEET: 0

## 2025-01-09 ASSESSMENT — COLUMBIA-SUICIDE SEVERITY RATING SCALE - C-SSRS
6. HAVE YOU EVER DONE ANYTHING, STARTED TO DO ANYTHING, OR PREPARED TO DO ANYTHING TO END YOUR LIFE?: NO
1. IN THE PAST MONTH, HAVE YOU WISHED YOU WERE DEAD OR WISHED YOU COULD GO TO SLEEP AND NOT WAKE UP?: NO
2. HAVE YOU ACTUALLY HAD ANY THOUGHTS OF KILLING YOURSELF?: NO

## 2025-01-09 NOTE — PROGRESS NOTES
Patient: Luba Coates    [id number]  : 1955 -- AGE 69 y.o.    Provider: Td Stiles DO     [facility]   Service Date: 2025              [facility]  Follow up Visit Note      HISTORY OF PRESENT ILLNESS     HISTORY OF PRESENT ILLNESS   Luba Coates is a 69 y.o. female who presents to a [facility] for a sleep medicine evaluation with concerns of Follow-up.     The patient  has a past medical history of Acute upper respiratory infection, unspecified (2014), Atypical squamous cells of undetermined significance on cytologic smear of cervix (ASC-US), Body mass index (BMI) 32.0-32.9, adult (2020), Cough, unspecified (2014), Encounter for gynecological examination (general) (routine) without abnormal findings (2013), Endometrial intraepithelial neoplasia (EIN) (2019), Personal history of other diseases of the female genital tract (2019), Personal history of other diseases of the musculoskeletal system and connective tissue, Personal history of other diseases of the nervous system and sense organs (2015), Personal history of other diseases of the respiratory system (2015), Personal history of other diseases of the respiratory system (2014), Personal history of other diseases of urinary system, Personal history of other mental and behavioral disorders, Personal history of other specified conditions (2018), Personal history of other specified conditions, Personal history of other specified conditions (2013), and Urinary tract infection, site not specified (2019)..    PAST SLEEP HISTORY     F with pmhx including paroxysmal A-fib, HTN.      Patient was diagnosed with A-fib around  and there is a concern for possible sleep apnea.      Patient has been snoring for years but has not done anything for it.      23  Patient had a sleep study and  would like to discuss her results.      8/10/23  Patient is trying to use pap therapy but has some pain on the left side of cheek due to a dental procedure in the past and cannot tolerate the mask straps touching it.   She is not using pap therapy currently.     CURRENT HISTORY    8/16/24    the patient reports that she had to return her pap therapy due to compliance issues.   She would like to restart pap therapy especially in the setting of some heart disease concerns.     1/9/25  Patient received a new machine and has been trying to use it. However, due to being ill, she only recently used it.     She tried a mask that covered her nose only and a full face mask. She is still deciding on her most comfortable mask.     Pap issues: admits to dry mouth. Denies skin irritation.   Pap benefit: decreased/eliminated: night time awakenings/ daytime sleepiness.     Sleep schedule  on weekdays / work days:  Usual Bedtime: varies  Sleep latency: varies  Wake time : varies   Total sleep time average/day: 6 hours/day  Awakenings: varies  Naps: vaires      Sleep schedule  on weekends/non work days :  Same as above     Sleep aids: no  Stimulants: no    Occupation: retired, used to work for the [email or web address] job.     Preferred sleeping position: SLEEP POSITION: sidelying    Sleep-related ROS:    Snoring:  unsure   Witnessed apneas:   n   Gasping/ choking: n       Am Dry mouth:   y          Nasal congestion:   seasonal allergies, zyrtec prn      am headaches: n    Sleep is described as refreshing.    Daytime sleepiness: sometimes   Fatigue or decreased energy: sometimes   Difficulty remembering things in daytime: n  Difficulty staying focused in daytime: : n  Irritable during the day: n    Drowsy driving: n  Hx of car accident: n  Near-miss Car accident: n      RLS screen:  RLSSCREEN: - Sensations: Patient does not have unusual sensations in their extremities that cause an urge to move them     Sleep-related behaviors:  DENIES      ESS: 5       REVIEW OF SYSTEMS     REVIEW OF SYSTEMS  Review of Systems   All other systems reviewed and are negative.        ALLERGIES AND MEDICATIONS     ALLERGIES  Allergies   Allergen Reactions    Nebivolol Other     Leg cramps    Statins-Hmg-Coa Reductase Inhibitors Myalgia    Tramadol Anxiety, Psychosis and Nausea/vomiting     hallucinations    Escitalopram Oxalate Nausea/vomiting       MEDICATIONS  Current Outpatient Medications   Medication Sig Dispense Refill    alendronate (Fosamax) 70 mg tablet TAKE 1 TABLET EVERY 7 DAYS IN THE MORNING WITH A FULL GLASS OF WATER, ON AN EMPTY STOMACH, AND DO NOT TAKE ANYTHING ELSE BY MOUTH OR LIE DOWN FOR THE NEXT 30 MINUTES. 12 tablet 3    apixaban (Eliquis) 5 mg tablet Take 1 tablet (5 mg) by mouth 2 times a day. 180 tablet 3    clobetasol (Temovate) 0.05 % cream Apply 1 Application topically 2 times a day.      ezetimibe (Zetia) 10 mg tablet TAKE 1 TABLET ONCE DAILY 90 tablet 2    furosemide (Lasix) 20 mg tablet Take 1 tablet (20 mg) by mouth once daily as needed.      loratadine (Claritin) 10 mg tablet Take 1 tablet (10 mg) by mouth once daily.      losartan (Cozaar) 25 mg tablet Take 1 tablet (25 mg) by mouth once daily. 90 tablet 3    Multaq 400 mg tablet Take 1 tablet (400 mg) by mouth 2 times daily (morning and late afternoon). 180 tablet 3     No current facility-administered medications for this visit.         PAST HISTORY     PAST MEDICAL HISTORY  She  has a past medical history of Acute upper respiratory infection, unspecified (04/08/2014), Atypical squamous cells of undetermined significance on cytologic smear of cervix (ASC-US), Body mass index (BMI) 32.0-32.9, adult (06/05/2020), Cough, unspecified (04/22/2014), Encounter for gynecological examination (general) (routine) without abnormal findings (12/03/2013), Endometrial intraepithelial neoplasia (EIN) (04/23/2019), Personal history of other diseases of the female genital tract (04/23/2019),  "Personal history of other diseases of the musculoskeletal system and connective tissue, Personal history of other diseases of the nervous system and sense organs (2015), Personal history of other diseases of the respiratory system (2015), Personal history of other diseases of the respiratory system (2014), Personal history of other diseases of urinary system, Personal history of other mental and behavioral disorders, Personal history of other specified conditions (2018), Personal history of other specified conditions, Personal history of other specified conditions (2013), and Urinary tract infection, site not specified (2019).    PAST SURGICAL HISTORY:  Past Surgical History:   Procedure Laterality Date     SECTION, CLASSIC  2013     Section    OTHER SURGICAL HISTORY  2020    Hysterectomy total abdominal with removal of both ovaries       FAMILY HISTORY  No family history on file.  DOES/DOES NOT EC: does not have a family history of sleep disorder.      SOCIAL HISTORY  She  reports that she has never smoked. She has never used smokeless tobacco. She reports current alcohol use. She reports that she does not use drugs.     Caffeine consumption: 1 cup coffee in am  Alcohol consumption: socially  Smoking: n  Marijuana: n  Other drugs: n      PHYSICAL EXAM     VITAL SIGNS: /77   Pulse 80   Resp 18   Ht 1.651 m (5' 5\")   Wt 93.1 kg (205 lb 3.2 oz)   SpO2 97%   BMI 34.15 kg/m²      PREVIOUS WEIGHTS:  Wt Readings from Last 3 Encounters:   25 93.1 kg (205 lb 3.2 oz)   24 91.6 kg (202 lb)   24 91.9 kg (202 lb 9.6 oz)       Physical Exam  Constitutional: Alert and oriented, cooperative, no obvious distress.   HENT: normocephalic.   Neurologic: AOx3.   psychiatric: appropriate mood and affect.  Integumentary: no significant rashes observed over pap mask area.         RESULTS/DATA     Iron (ug/dL)   Date Value   2019 89     Iron " Saturation (%)   Date Value   02/13/2019 23 (L)     TIBC (ug/dL)   Date Value   02/13/2019 387               ASSESSMENT/PLAN     Ms. Coates is a 69 y.o. female and  has a past medical history of Acute upper respiratory infection, unspecified (04/08/2014), Atypical squamous cells of undetermined significance on cytologic smear of cervix (ASC-US), Body mass index (BMI) 32.0-32.9, adult (06/05/2020), Cough, unspecified (04/22/2014), Encounter for gynecological examination (general) (routine) without abnormal findings (12/03/2013), Endometrial intraepithelial neoplasia (EIN) (04/23/2019), Personal history of other diseases of the female genital tract (04/23/2019), Personal history of other diseases of the musculoskeletal system and connective tissue, Personal history of other diseases of the nervous system and sense organs (05/29/2015), Personal history of other diseases of the respiratory system (05/29/2015), Personal history of other diseases of the respiratory system (04/25/2014), Personal history of other diseases of urinary system, Personal history of other mental and behavioral disorders, Personal history of other specified conditions (05/28/2018), Personal history of other specified conditions, Personal history of other specified conditions (08/07/2013), and Urinary tract infection, site not specified (09/24/2019). She is following up on sleep apnea.     Problem List Items Addressed This Visit    None      Problem List and Orders     F with pmhx including paroxysmal A-fib, HTN.      1- GAVIOTA  symptoms include snoring, night time awakenings, nocturia.   PSG 4/12/23 --> mild GAVIOTA, RDI 3%13.4, RDI 4% 8, OLVIN 0; SpO2 karlee 86%.      Reviewed and discussed the above sleep study results and management options in details. All questions answered, patient verbalizes understanding.      DME is [facility] but would like to try [facility] now.     Cont. Autopap 5-15cwp, rAHI 0.7, median pressure 6 cmH2O, max avg 9.1, needs to use pap  therapy more regularly.    -counseled on the importance of compliance  -she does not wish to try an oral appliance or excite.    -in lab sleep study is scheduled.     -ordered supplies and mask refitting  -You can use a chin strap to help with your dry mouth, you can also adjust your humidity level to your comfort. You can use a nasal spray to help you breathe better from your nose.    -do not drive or operate heavy machinery if drowsy.  -avoid sleeping on your back.   -avoid sedating substances/ medication, alcohol, illicit drugs and tobacco.     2- Obesity  counseled on eating a healthy diet and exercising as tolerated.       3- HTN  Follow up with pcp/ cardiology  Cont. Current regimen     4- paroxysmal A-fib  Follow up with cardiology   Cont. Current regimen     f/u 3 months or sooner as needed.

## 2025-01-22 ENCOUNTER — CLINICAL SUPPORT (OUTPATIENT)
Dept: SLEEP MEDICINE | Facility: HOSPITAL | Age: 70
End: 2025-01-22
Payer: MEDICARE

## 2025-01-22 DIAGNOSIS — I48.0 PAROXYSMAL ATRIAL FIBRILLATION (MULTI): ICD-10-CM

## 2025-01-22 DIAGNOSIS — G47.33 OSA (OBSTRUCTIVE SLEEP APNEA): ICD-10-CM

## 2025-01-22 RX ORDER — DRONEDARONE 400 MG/1
400 TABLET, FILM COATED ORAL
Qty: 180 TABLET | Refills: 3 | Status: SHIPPED | OUTPATIENT
Start: 2025-01-22 | End: 2026-01-22

## 2025-01-23 VITALS — WEIGHT: 205.25 LBS | HEIGHT: 65 IN | BODY MASS INDEX: 34.2 KG/M2

## 2025-01-23 ASSESSMENT — SLEEP AND FATIGUE QUESTIONNAIRES
HOW LIKELY ARE YOU TO NOD OFF OR FALL ASLEEP WHILE SITTING AND READING: SLIGHT CHANCE OF DOZING
HOW LIKELY ARE YOU TO NOD OFF OR FALL ASLEEP WHILE LYING DOWN TO REST IN THE AFTERNOON WHEN CIRCUMSTANCES PERMIT: MODERATE CHANCE OF DOZING
HOW LIKELY ARE YOU TO NOD OFF OR FALL ASLEEP WHILE WATCHING TV: MODERATE CHANCE OF DOZING
HOW LIKELY ARE YOU TO NOD OFF OR FALL ASLEEP IN A CAR, WHILE STOPPED FOR A FEW MINUTES IN TRAFFIC: WOULD NEVER DOZE
ESS-CHAD TOTAL SCORE: 5
SITING INACTIVE IN A PUBLIC PLACE LIKE A CLASS ROOM OR A MOVIE THEATER: WOULD NEVER DOZE
HOW LIKELY ARE YOU TO NOD OFF OR FALL ASLEEP WHEN YOU ARE A PASSENGER IN A CAR FOR AN HOUR WITHOUT A BREAK: WOULD NEVER DOZE
HOW LIKELY ARE YOU TO NOD OFF OR FALL ASLEEP WHILE SITTING QUIETLY AFTER LUNCH WITHOUT ALCOHOL: WOULD NEVER DOZE
HOW LIKELY ARE YOU TO NOD OFF OR FALL ASLEEP WHILE SITTING AND TALKING TO SOMEONE: WOULD NEVER DOZE

## 2025-01-23 NOTE — PROGRESS NOTES
[facility] TECH NOTE:     Patient: Luba Coates   MRN//AGE: [id number]  1955  69 y.o.   Technologist: July Damico   Room: 402   Service Date: 2025        Sleep Testing Location: [facility]    [place]: 5    TECHNOLOGIST SLEEP STUDY PROCEDURE NOTE:   This sleep study is being conducted according to the policies and procedures outlined by the AAS accreditation standards.  The sleep study procedure and processes involved during this appointment was explained to the patient/patient’s family, questions were answered. The patient/family verbalized understanding.      The patient is a 69 y.o. year old female scheduled for a Diagnostic PSG Split night with montage of:  Split . she arrived for her appointment.      The study that was ultimately completed was a Diagnostic PSG Split night with montage of:  PSG .    The full study Was completed.  Patient questionnaires completed?: yes     Consents signed? yes    Initial Fall Risk Screening:     Luba has not fallen in the last 6 months. her fall did not result in injury. Luba does not have a fear of falling. She does not need assistance with sitting, standing, or walking. she does not need assistance walking in her home. she does not need assistance in an unfamiliar setting. The patient is not using an assistive device.     Brief Study observations: The patient arrived for her ordered Split study, of AHI >10 per protocol for a current CPAP user. She states she had an HST done several months ago that showed mild GAVIOTA and she was put on CPAP, which she has used for about 2 weeks. The patient needs an in lab study for Medicare qualifications. The patient wears an under the nose FFM, stating she could not tolerate a nasal mask. The hook-up was done and the purpose of all sensors was explained. A normal sleep latency was seen. Once asleep, intermittent mild snoring/audible breathing was heard. Few respiratory events were noted. All sleep stages were seen. REM supine was  captured. Respiratory events worsened while REM supine. The patient was observed sleeping in supine, and left side positions. NSR was observed throughout the study.    Deviation to order/protocol and reason: N/A      If PAP, which was preferred mask/pressure/mode: N/A      Other: None    After the procedure, the patient/family was informed to ensure followup with ordering clinician for testing results.      Technologist: July Damico

## 2025-02-26 NOTE — PROGRESS NOTES
Chief Complaint:   No chief complaint on file.     History Of Present Illness:    Luba Coates is a 69 y.o. female with a history of paroxysmal atrial fibrillation, hypertension, and dyslipidemia here for follow-up.    Since last visit she was taken off her diltiazem when her Multaq was increased to 400 mg daily.  Was put on losartan 25 mg daily to help with her blood pressure.  She says that she has had 18 episodes of atrial fibrillation since May 2024.  She has a journal that she keeps with the episodes.  Overall she says that she thinks this is working better than when she was on the lower dose of Multaq.    She was given brings up the idea of cholesterol management.  She has been told that she would be a good candidate for statin therapy however had side effects with several statins.  She was prescribed Nexletol however could not get it approved.  She has been tolerant of ezetimibe.  Someone had brought up the idea of PCSK9 inhibitors.    Her daughter is a nurse practitioner with the cardiology group in [place]. She has run many of her thoughts by her daughter to get her input.     The patient denies any chest pain. She has no personal history of coronary disease. She had a calcium score of 0.      Reviewed Dr. Buchanan's note from 11/7/2024.  The patient had increased her Multaq to 400 mg twice a day and had noticed an improvement in her frequency of palpitations.  Dr. Buchanan had asked her to stop the diltiazem due to concern for interactions with Multaq.  Was started on an ARB (losartan 25 mg daily) to assist with blood pressure control.    Coronary calcium score 10/7/2021: Total score of 0. Ascending aorta normal dimension.     Echocardiogram 2/6/2020: EF 50 to 55%. Mild concentric LVH. Grade 2 diastolic dysfunction. Mild AR, MR, and TR. RVSP 34 mmHg.     Stress echocardiogram 2/6/2020: EF at rest 55% increasing to 65% postexercise. No evidence of ischemia.     6-day Holter monitor February 2020: Sinus rhythm  "with PACs and PVCs along with 3 episodes of atrial tachycardia with the longest and fastest episode of 4 beats at 151 bpm.      Allergies:  Nebivolol, Statins-hmg-coa reductase inhibitors, Tramadol, and Escitalopram oxalate    Outpatient Medications:  Current Outpatient Medications   Medication Instructions    alendronate (Fosamax) 70 mg tablet TAKE 1 TABLET EVERY 7 DAYS IN THE MORNING WITH A FULL GLASS OF WATER, ON AN EMPTY STOMACH, AND DO NOT TAKE ANYTHING ELSE BY MOUTH OR LIE DOWN FOR THE NEXT 30 MINUTES.    apixaban (ELIQUIS) 5 mg, oral, 2 times daily    cholecalciferol (VITAMIN D3) 1,000 Units, Daily    ezetimibe (ZETIA) 10 mg, oral, Daily    furosemide (LASIX) 20 mg, Daily PRN    loratadine (CLARITIN) 10 mg, Daily    losartan (COZAAR) 25 mg, oral, Daily    magnesium glycinate 200 mg, Daily    Multaq 400 mg, oral, 2 times daily (morning and late afternoon)       Last Recorded Vitals:  Visit Vitals  /82 (BP Location: Left arm, Patient Position: Sitting, BP Cuff Size: Adult)   Pulse 88   Ht 1.651 m (5' 5\")   Wt 95.3 kg (210 lb)   SpO2 99%   BMI 34.95 kg/m²   OB Status Hysterectomy   Smoking Status Never   BSA 2.09 m²      LASTWT(3):   Wt Readings from Last 3 Encounters:   02/27/25 95.3 kg (210 lb)   01/23/25 93.1 kg (205 lb 4 oz)   01/09/25 93.1 kg (205 lb 3.2 oz)       Physical Exam:  In general: alert and in no acute distress.   HEENT: Carotid upstrokes normal with no bruits. JVP is normal.   Pulmonary: Clear to auscultation bilaterally.  Cardiovascular: S1, S2, regular. No appreciable murmurs, rubs or gallops.   Lower extremities: Warm. 2+ distal pulses. No edema.     Last Labs:  CBC -  Recent Labs     11/04/24  0940 02/15/24  1039 05/18/23  1407   WBC 5.7 5.1 6.0   HGB 14.1 14.3 14.2   HCT 42.9 43.3 43.9    281 334   MCV 87 84 88       CMP -  Recent Labs     11/04/24  0940 02/15/24  1039 05/18/23  1407 06/22/22  1025 05/19/20  0738 02/21/20  0807    140 139 138   < > 140   K 4.3 4.0 4.3 4.9 " "  < > 4.8    105 105 103   < > 105   CO2 29 26 28 28   < > 24   ANIONGAP 13 13 10 12   < > 16   BUN 12 12 11 13   < > 15   CREATININE 0.79 0.65 0.75 0.70   < > 0.66   EGFR 83 >90  --   --   --   --    MG  --  2.20  --  2.00  --  2.68*    < > = values in this interval not displayed.     Recent Labs     11/04/24  0940 02/15/24  1039 05/18/23  1407   ALBUMIN 4.2 4.3 4.4   ALKPHOS 115 99 123   ALT 26 19 23   AST 22 18 21   BILITOT 0.5 0.4 0.4       LIPID PANEL -   Recent Labs     11/04/24  0940 05/22/24  1210 05/18/23  1407 06/22/22  1025 02/12/22  1844   CHOL 249* 251* 231* 218* 215*   LDLCALC 149* 162*  --   --   --    LDLF  --   --  151* 126* 133*   HDL 68.9 64.5 60.6 66.0 60.0   TRIG 154* 124 98 129 110       No results for input(s): \"BNP\", \"HGBA1C\" in the last [id number] hours.        Assessment/Plan   1) paroxysmal atrial fibrillation: Recommended she continue with Multaq 400 mg daily and follow-up with Dr. Buchanan next month.  Explained to her that it is possible that ablative therapy would be a better strategy as she is still having breakthrough episodes of atrial fibrillation.  I will defer to Dr. Buchanan in this regard.    In the meantime continue with Eliquis 5 mg twice a day for thromboembolic prevention.     2) dyslipidemia: Intolerant to rosuvastatin, atorvastatin, pravastatin and simvastatin. Tolerant of ezetimibe.  We talked about the [place] preventive task force for disease prevention guidelines.  Her 10-year cardiovascular risk is 8.6%.  Her age is greater than 60.  At this point there is no recommendation for statin therapy.  I would not advocate for PCSK9 inhibitor.  I would asked that she stay on the ezetimibe.     3) diastolic dysfunction: Very minimal lower extremity edema. No changes needed.     4) sleep apnea: On APAP.     5) hypertension: She believes her blood pressure has been fluctuating.  Asked her to check her blood pressure after sitting and waiting 5 minutes at home.  Call with readings.  If " elevated would increase losartan to 50 mg daily.    6) follow-up: 1 year or sooner if needed.      Carmelo Grady MD

## 2025-02-27 ENCOUNTER — OFFICE VISIT (OUTPATIENT)
Dept: CARDIOLOGY | Facility: CLINIC | Age: 70
End: 2025-02-27
Payer: COMMERCIAL

## 2025-02-27 VITALS
HEIGHT: 65 IN | OXYGEN SATURATION: 99 % | HEART RATE: 88 BPM | DIASTOLIC BLOOD PRESSURE: 82 MMHG | WEIGHT: 210 LBS | BODY MASS INDEX: 34.99 KG/M2 | SYSTOLIC BLOOD PRESSURE: 122 MMHG

## 2025-02-27 DIAGNOSIS — I48.0 PAROXYSMAL ATRIAL FIBRILLATION (MULTI): Primary | ICD-10-CM

## 2025-02-27 DIAGNOSIS — I10 BENIGN ESSENTIAL HYPERTENSION: ICD-10-CM

## 2025-02-27 PROCEDURE — 99214 OFFICE O/P EST MOD 30 MIN: CPT | Performed by: INTERNAL MEDICINE

## 2025-02-27 PROCEDURE — 3074F SYST BP LT 130 MM HG: CPT | Performed by: INTERNAL MEDICINE

## 2025-02-27 PROCEDURE — 1159F MED LIST DOCD IN RCRD: CPT | Performed by: INTERNAL MEDICINE

## 2025-02-27 PROCEDURE — 1126F AMNT PAIN NOTED NONE PRSNT: CPT | Performed by: INTERNAL MEDICINE

## 2025-02-27 PROCEDURE — 1123F ACP DISCUSS/DSCN MKR DOCD: CPT | Performed by: INTERNAL MEDICINE

## 2025-02-27 PROCEDURE — 1036F TOBACCO NON-USER: CPT | Performed by: INTERNAL MEDICINE

## 2025-02-27 PROCEDURE — 1160F RVW MEDS BY RX/DR IN RCRD: CPT | Performed by: INTERNAL MEDICINE

## 2025-02-27 PROCEDURE — 3008F BODY MASS INDEX DOCD: CPT | Performed by: INTERNAL MEDICINE

## 2025-02-27 PROCEDURE — 3079F DIAST BP 80-89 MM HG: CPT | Performed by: INTERNAL MEDICINE

## 2025-02-27 RX ORDER — CHOLECALCIFEROL (VITAMIN D3) 25 MCG
1000 TABLET ORAL DAILY
COMMUNITY

## 2025-02-27 ASSESSMENT — PAIN SCALES - GENERAL: PAINLEVEL_OUTOF10: 0-NO PAIN

## 2025-03-13 ENCOUNTER — APPOINTMENT (OUTPATIENT)
Dept: CARDIOLOGY | Facility: CLINIC | Age: 70
End: 2025-03-13
Payer: MEDICARE

## 2025-03-13 VITALS
BODY MASS INDEX: 34.95 KG/M2 | SYSTOLIC BLOOD PRESSURE: 134 MMHG | DIASTOLIC BLOOD PRESSURE: 80 MMHG | HEART RATE: 77 BPM | WEIGHT: 210 LBS | OXYGEN SATURATION: 97 %

## 2025-03-13 DIAGNOSIS — G47.33 OSA (OBSTRUCTIVE SLEEP APNEA): ICD-10-CM

## 2025-03-13 DIAGNOSIS — I10 BENIGN ESSENTIAL HYPERTENSION: ICD-10-CM

## 2025-03-13 DIAGNOSIS — I48.0 PAROXYSMAL ATRIAL FIBRILLATION (MULTI): Primary | ICD-10-CM

## 2025-03-13 DIAGNOSIS — E66.811 CLASS 1 OBESITY DUE TO EXCESS CALORIES WITH SERIOUS COMORBIDITY AND BODY MASS INDEX (BMI) OF 34.0 TO 34.9 IN ADULT: ICD-10-CM

## 2025-03-13 DIAGNOSIS — E66.09 CLASS 1 OBESITY DUE TO EXCESS CALORIES WITH SERIOUS COMORBIDITY AND BODY MASS INDEX (BMI) OF 34.0 TO 34.9 IN ADULT: ICD-10-CM

## 2025-03-13 PROCEDURE — 1123F ACP DISCUSS/DSCN MKR DOCD: CPT | Performed by: STUDENT IN AN ORGANIZED HEALTH CARE EDUCATION/TRAINING PROGRAM

## 2025-03-13 PROCEDURE — 3075F SYST BP GE 130 - 139MM HG: CPT | Performed by: STUDENT IN AN ORGANIZED HEALTH CARE EDUCATION/TRAINING PROGRAM

## 2025-03-13 PROCEDURE — 99214 OFFICE O/P EST MOD 30 MIN: CPT | Performed by: STUDENT IN AN ORGANIZED HEALTH CARE EDUCATION/TRAINING PROGRAM

## 2025-03-13 PROCEDURE — 1159F MED LIST DOCD IN RCRD: CPT | Performed by: STUDENT IN AN ORGANIZED HEALTH CARE EDUCATION/TRAINING PROGRAM

## 2025-03-13 PROCEDURE — 3079F DIAST BP 80-89 MM HG: CPT | Performed by: STUDENT IN AN ORGANIZED HEALTH CARE EDUCATION/TRAINING PROGRAM

## 2025-03-13 PROCEDURE — 93000 ELECTROCARDIOGRAM COMPLETE: CPT | Performed by: STUDENT IN AN ORGANIZED HEALTH CARE EDUCATION/TRAINING PROGRAM

## 2025-03-13 PROCEDURE — 1036F TOBACCO NON-USER: CPT | Performed by: STUDENT IN AN ORGANIZED HEALTH CARE EDUCATION/TRAINING PROGRAM

## 2025-03-13 NOTE — PROGRESS NOTES
Cardiac Electrophysiology Office Visit     Referred by Kiko Lugo MD for   No chief complaint on file.     HPI:  Luba Coates is a 69 y.o. year old female patient with h/o paroxysmal atrial fibrillation (on Eliquis and propafenone, diltiazem), HLD (multiple statins tried but not able to tolerate due to myalgias) presenting today for follow-up    PMHx/PSHx: As above  Tobacco never, alcohol - social, caffeine use 1 cup/day, drug use - denies  FamHx: Father - AF in 70s, Mother - AF in her 90s,     Objective  Allergies   Allergen Reactions    Nebivolol Other     Leg cramps    Statins-Hmg-Coa Reductase Inhibitors Myalgia    Tramadol Anxiety, Psychosis and Nausea/vomiting     hallucinations    Escitalopram Oxalate Nausea/vomiting      Current Outpatient Medications   Medication Instructions    alendronate (Fosamax) 70 mg tablet TAKE 1 TABLET EVERY 7 DAYS IN THE MORNING WITH A FULL GLASS OF WATER, ON AN EMPTY STOMACH, AND DO NOT TAKE ANYTHING ELSE BY MOUTH OR LIE DOWN FOR THE NEXT 30 MINUTES.    apixaban (ELIQUIS) 5 mg, oral, 2 times daily    cholecalciferol (VITAMIN D3) 1,000 Units, Daily    ezetimibe (ZETIA) 10 mg, oral, Daily    furosemide (LASIX) 20 mg, Daily PRN    loratadine (CLARITIN) 10 mg, Daily    losartan (COZAAR) 25 mg, oral, Daily    magnesium glycinate 200 mg, Daily    Multaq 400 mg, oral, 2 times daily (morning and late afternoon)      Visit Vitals  /80 (BP Location: Left arm, Patient Position: Sitting)   Pulse 77   Wt 95.3 kg (210 lb)   SpO2 97%   BMI 34.95 kg/m²   OB Status Hysterectomy   Smoking Status Never   BSA 2.09 m²      Physical Exam  Vitals reviewed.   Constitutional:       Appearance: Normal appearance.   HENT:      Head: Normocephalic.   Cardiovascular:      Rate and Rhythm: Normal rate and regular rhythm.   Pulmonary:      Effort: Pulmonary effort is normal. No respiratory distress.      Breath sounds: No wheezing.   Skin:     General: Skin is warm and dry.      Capillary  Refill: Capillary refill takes less than 2 seconds.   Neurological:      Mental Status: She is alert.   Psychiatric:         Mood and Affect: Mood normal.        My Interpretation of Reviewed Study(s):  Coronary calcium score 10/7/2021: Total score of 0. Ascending aorta normal dimension.  Echo (Feb 2020): EF 50 to 55%. Mild concentric LVH. Grade 2 diastolic dysfunction. Mild AR, MR, and TR. RVSP 34 mmHg.  Stress echo (Feb 2020): EF at rest 55% increasing to 65% postexercise. No evidence of ischemia.  6-day cardiac monitor (Feb 2020): Sinus rhythm with PACs and PVCs along with 3 episodes of atrial tachycardia with the longest and fastest episode of 4 beats at 151 bpm.     Assessment/Plan   # Paroxysmal atrial fibrillation  AF Dx History: July 2020; h/o Cardioversion: No; AAD Use:  Propafenone 150mg BID ; Anticoagulation use: Apixaban 5mg BID (current); h/o Ablation: None; [id number] Score: 3  Patient reports improvement in her episodes frequency and duration.  She is tolerating trazodone well.  Has noticed a few triggers including alcohol, caffeine and has been doing better when she has been avoiding those triggers.  c/w AC: Apixaban 5mg BID  c/w Dronedarone 400mg BID     #Dronedarone (Multaq) - High risk medications  Labs:  Recent Labs     11/04/24  0940 02/15/24  1039 05/18/23  1407 06/22/22  1025 06/18/21  1010   AST 22 18 21 19 18   ALT 26 19 23 18 17     Follow up:  LFTs (Within 6 months of initiation): Dated 11/4; Within normal limits / No change compared to prior  ECG (Every 6 - 12 months): ECG Dated 3/13/25; QT/Qtc Stable  Continue with Dronedarone 400mg twice a day  Recheck prior to next visit      #HTN  Pt is coming off Diltiazem, so will try low dose ARB. Renal function is intact  C/w Losartan 25mg Daily  Is taking readings at home, and will talk to Dr. Grady about need for adjustment    #HLD  Has not tolerated statin therapy  On Ezetimide     Return to Clinic: Patient should return to the EP Clinic in  6 months    Kiko Buchanan MD [facility]  Cardiac Electrophysiology  [email or web address]    **Disclaimer: This note was dictated by speech recognition, and every effort has been made to prevent any error in transcription, however minor errors may be present**

## 2025-03-21 DIAGNOSIS — I10 BENIGN ESSENTIAL HYPERTENSION: ICD-10-CM

## 2025-03-21 DIAGNOSIS — I48.0 PAROXYSMAL ATRIAL FIBRILLATION (MULTI): ICD-10-CM

## 2025-03-21 RX ORDER — LOSARTAN POTASSIUM 25 MG/1
25 TABLET ORAL DAILY
Qty: 90 TABLET | Refills: 3 | Status: SHIPPED | OUTPATIENT
Start: 2025-03-21 | End: 2026-03-21

## 2025-04-03 ENCOUNTER — APPOINTMENT (OUTPATIENT)
Facility: CLINIC | Age: 70
End: 2025-04-03
Payer: MEDICARE

## 2025-04-14 ENCOUNTER — APPOINTMENT (OUTPATIENT)
Facility: CLINIC | Age: 70
End: 2025-04-14
Payer: MEDICARE

## 2025-04-14 VITALS
WEIGHT: 210.8 LBS | HEIGHT: 65 IN | DIASTOLIC BLOOD PRESSURE: 81 MMHG | SYSTOLIC BLOOD PRESSURE: 136 MMHG | BODY MASS INDEX: 35.12 KG/M2 | RESPIRATION RATE: 18 BRPM | HEART RATE: 67 BPM | OXYGEN SATURATION: 97 %

## 2025-04-14 DIAGNOSIS — G47.33 OSA (OBSTRUCTIVE SLEEP APNEA): Primary | ICD-10-CM

## 2025-04-14 DIAGNOSIS — E66.9 OBESITY (BMI 30-39.9): ICD-10-CM

## 2025-04-14 DIAGNOSIS — I48.0 PAROXYSMAL ATRIAL FIBRILLATION (MULTI): ICD-10-CM

## 2025-04-14 DIAGNOSIS — I10 BENIGN ESSENTIAL HYPERTENSION: ICD-10-CM

## 2025-04-14 PROCEDURE — 1036F TOBACCO NON-USER: CPT | Performed by: GENERAL PRACTICE

## 2025-04-14 PROCEDURE — 1160F RVW MEDS BY RX/DR IN RCRD: CPT | Performed by: GENERAL PRACTICE

## 2025-04-14 PROCEDURE — G8433 SCR FOR DEP NOT CPT DOC RSN: HCPCS | Performed by: GENERAL PRACTICE

## 2025-04-14 PROCEDURE — 1123F ACP DISCUSS/DSCN MKR DOCD: CPT | Performed by: GENERAL PRACTICE

## 2025-04-14 PROCEDURE — 99214 OFFICE O/P EST MOD 30 MIN: CPT | Performed by: GENERAL PRACTICE

## 2025-04-14 PROCEDURE — 1159F MED LIST DOCD IN RCRD: CPT | Performed by: GENERAL PRACTICE

## 2025-04-14 PROCEDURE — G2211 COMPLEX E/M VISIT ADD ON: HCPCS | Performed by: GENERAL PRACTICE

## 2025-04-14 PROCEDURE — 3008F BODY MASS INDEX DOCD: CPT | Performed by: GENERAL PRACTICE

## 2025-04-14 PROCEDURE — 3075F SYST BP GE 130 - 139MM HG: CPT | Performed by: GENERAL PRACTICE

## 2025-04-14 PROCEDURE — 3079F DIAST BP 80-89 MM HG: CPT | Performed by: GENERAL PRACTICE

## 2025-04-14 ASSESSMENT — PATIENT HEALTH QUESTIONNAIRE - PHQ9
1. LITTLE INTEREST OR PLEASURE IN DOING THINGS: NOT AT ALL
2. FEELING DOWN, DEPRESSED OR HOPELESS: NOT AT ALL
SUM OF ALL RESPONSES TO PHQ9 QUESTIONS 1 AND 2: 0

## 2025-04-14 ASSESSMENT — ENCOUNTER SYMPTOMS
DEPRESSION: 0
OCCASIONAL FEELINGS OF UNSTEADINESS: 0
LOSS OF SENSATION IN FEET: 0

## 2025-04-14 ASSESSMENT — SLEEP AND FATIGUE QUESTIONNAIRES
HOW LIKELY ARE YOU TO NOD OFF OR FALL ASLEEP WHILE LYING DOWN TO REST IN THE AFTERNOON WHEN CIRCUMSTANCES PERMIT: SLIGHT CHANCE OF DOZING
HOW LIKELY ARE YOU TO NOD OFF OR FALL ASLEEP IN A CAR, WHILE STOPPED FOR A FEW MINUTES IN TRAFFIC: WOULD NEVER DOZE
SITING INACTIVE IN A PUBLIC PLACE LIKE A CLASS ROOM OR A MOVIE THEATER: WOULD NEVER DOZE
HOW LIKELY ARE YOU TO NOD OFF OR FALL ASLEEP WHILE SITTING AND READING: SLIGHT CHANCE OF DOZING
HOW LIKELY ARE YOU TO NOD OFF OR FALL ASLEEP WHILE SITTING AND TALKING TO SOMEONE: WOULD NEVER DOZE
ESS-CHAD TOTAL SCORE: 4
HOW LIKELY ARE YOU TO NOD OFF OR FALL ASLEEP WHILE SITTING QUIETLY AFTER LUNCH WITHOUT ALCOHOL: WOULD NEVER DOZE
HOW LIKELY ARE YOU TO NOD OFF OR FALL ASLEEP WHILE WATCHING TV: MODERATE CHANCE OF DOZING
HOW LIKELY ARE YOU TO NOD OFF OR FALL ASLEEP WHEN YOU ARE A PASSENGER IN A CAR FOR AN HOUR WITHOUT A BREAK: WOULD NEVER DOZE

## 2025-04-14 ASSESSMENT — COLUMBIA-SUICIDE SEVERITY RATING SCALE - C-SSRS
6. HAVE YOU EVER DONE ANYTHING, STARTED TO DO ANYTHING, OR PREPARED TO DO ANYTHING TO END YOUR LIFE?: NO
2. HAVE YOU ACTUALLY HAD ANY THOUGHTS OF KILLING YOURSELF?: NO
1. IN THE PAST MONTH, HAVE YOU WISHED YOU WERE DEAD OR WISHED YOU COULD GO TO SLEEP AND NOT WAKE UP?: NO

## 2025-04-14 NOTE — PROGRESS NOTES
Patient: Luba Coates    [id number]  : 1955 -- AGE 69 y.o.    Provider: Td Stiles DO     [facility]   Service Date: 2025              [facility]  Follow up Visit Note      HISTORY OF PRESENT ILLNESS     HISTORY OF PRESENT ILLNESS   Luba Coates is a 69 y.o. female who presents to a [facility] for a sleep medicine evaluation with concerns of Sleep Apnea.     The patient  has a past medical history of Acute upper respiratory infection, unspecified (2014), Atypical squamous cells of undetermined significance on cytologic smear of cervix (ASC-US), Body mass index (BMI) 32.0-32.9, adult (2020), Cough, unspecified (2014), Encounter for gynecological examination (general) (routine) without abnormal findings (2013), Endometrial intraepithelial neoplasia (EIN) (2019), Personal history of other diseases of the female genital tract (2019), Personal history of other diseases of the musculoskeletal system and connective tissue, Personal history of other diseases of the nervous system and sense organs (2015), Personal history of other diseases of the respiratory system (2015), Personal history of other diseases of the respiratory system (2014), Personal history of other diseases of urinary system, Personal history of other mental and behavioral disorders, Personal history of other specified conditions (2018), Personal history of other specified conditions, Personal history of other specified conditions (2013), and Urinary tract infection, site not specified (2019)..    PAST SLEEP HISTORY     F with pmhx including paroxysmal A-fib, HTN.      Patient was diagnosed with A-fib around  and there is a concern for possible sleep apnea.      Patient has been snoring for years but has not done anything for it.      23  Patient had a sleep study  and would like to discuss her results.      8/10/23  Patient is trying to use pap therapy but has some pain on the left side of cheek due to a dental procedure in the past and cannot tolerate the mask straps touching it.   She is not using pap therapy currently.     CURRENT HISTORY    8/16/24    the patient reports that she had to return her pap therapy due to compliance issues.   She would like to restart pap therapy especially in the setting of some heart disease concerns.     1/9/25  Patient received a new machine and has been trying to use it. However, due to being ill, she only recently used it.     She tried a mask that covered her nose only and a full face mask. She is still deciding on her most comfortable mask.     Pap issues: admits to dry mouth. Denies skin irritation.   Pap benefit: decreased/eliminated: night time awakenings/ daytime sleepiness.     4/14/25  Patient states that she is trying to use pap therapy regularly. She is comfortable with her mask and settings.     Pap issues: admits to dry mouth. Denies skin irritation, denies perceived mask air leak.     Pap benefit: decreased/eliminated: night time awakenings/nocturia/ daytime sleepiness.     Sleep schedule  on weekdays / work days:  Usual Bedtime: 1 am  Sleep latency:  30 min  Wake time : 7 am   Total sleep time average/day: 6.5 hours/day  Awakenings: varies  Naps: vaires      Sleep schedule  on weekends/non work days :  Same as above     Sleep aids: no  Stimulants: no    Occupation: retired, used to work for the [employer] job.     Preferred sleeping position: SLEEP POSITION: sidelying    Sleep-related ROS:    Snoring: no  Witnessed apneas:   n   Gasping/ choking: n       Am Dry mouth:   y          Nasal congestion:   seasonal allergies, zyrtec prn      am headaches: n    Sleep is described as refreshing.    Daytime sleepiness: sometimes   Fatigue or decreased energy: sometimes   Difficulty remembering things in daytime:  n  Difficulty staying focused in daytime: : n  Irritable during the day: n    Drowsy driving: n  Hx of car accident: n  Near-miss Car accident: n      RLS screen:  RLSSCREEN: - Sensations: Patient does not have unusual sensations in their extremities that cause an urge to move them     Sleep-related behaviors: DENIES      ESS: 4       REVIEW OF SYSTEMS     REVIEW OF SYSTEMS  Review of Systems   All other systems reviewed and are negative.        ALLERGIES AND MEDICATIONS     ALLERGIES  Allergies   Allergen Reactions    Nebivolol Other     Leg cramps    Statins-Hmg-Coa Reductase Inhibitors Myalgia    Tramadol Anxiety, Psychosis and Nausea/vomiting     hallucinations    Escitalopram Oxalate Nausea/vomiting       MEDICATIONS  Current Outpatient Medications   Medication Sig Dispense Refill    alendronate (Fosamax) 70 mg tablet TAKE 1 TABLET EVERY 7 DAYS IN THE MORNING WITH A FULL GLASS OF WATER, ON AN EMPTY STOMACH, AND DO NOT TAKE ANYTHING ELSE BY MOUTH OR LIE DOWN FOR THE NEXT 30 MINUTES. 12 tablet 3    apixaban (Eliquis) 5 mg tablet Take 1 tablet (5 mg) by mouth 2 times a day. 180 tablet 3    cholecalciferol (Vitamin D3) 25 mcg (1000 units) tablet Take 1 tablet (1,000 Units) by mouth once daily.      ezetimibe (Zetia) 10 mg tablet TAKE 1 TABLET ONCE DAILY 90 tablet 2    furosemide (Lasix) 20 mg tablet Take 1 tablet (20 mg) by mouth once daily as needed.      loratadine (Claritin) 10 mg tablet Take 1 tablet (10 mg) by mouth once daily.      losartan (Cozaar) 25 mg tablet Take 1 tablet (25 mg) by mouth once daily. 90 tablet 3    magnesium glycinate 100 mg tablet Take 200 mg by mouth once daily.      Multaq 400 mg tablet Take 1 tablet (400 mg) by mouth 2 times daily (morning and late afternoon). 180 tablet 3     No current facility-administered medications for this visit.         PAST HISTORY     PAST MEDICAL HISTORY  She  has a past medical history of Acute upper respiratory infection, unspecified (04/08/2014),  "Atypical squamous cells of undetermined significance on cytologic smear of cervix (ASC-US), Body mass index (BMI) 32.0-32.9, adult (2020), Cough, unspecified (2014), Encounter for gynecological examination (general) (routine) without abnormal findings (2013), Endometrial intraepithelial neoplasia (EIN) (2019), Personal history of other diseases of the female genital tract (2019), Personal history of other diseases of the musculoskeletal system and connective tissue, Personal history of other diseases of the nervous system and sense organs (2015), Personal history of other diseases of the respiratory system (2015), Personal history of other diseases of the respiratory system (2014), Personal history of other diseases of urinary system, Personal history of other mental and behavioral disorders, Personal history of other specified conditions (2018), Personal history of other specified conditions, Personal history of other specified conditions (2013), and Urinary tract infection, site not specified (2019).    PAST SURGICAL HISTORY:  Past Surgical History:   Procedure Laterality Date     SECTION, CLASSIC  2013     Section    OTHER SURGICAL HISTORY  2020    Hysterectomy total abdominal with removal of both ovaries       FAMILY HISTORY  No family history on file.  DOES/DOES NOT EC: does not have a family history of sleep disorder.      SOCIAL HISTORY  She  reports that she has never smoked. She has never used smokeless tobacco. She reports current alcohol use. She reports that she does not use drugs.     Caffeine consumption: 1 cup coffee in am  Alcohol consumption: socially  Smoking: n  Marijuana: n  Other drugs: n      PHYSICAL EXAM     VITAL SIGNS: /81   Pulse 67   Resp 18   Ht 1.651 m (5' 5\")   Wt 95.6 kg (210 lb 12.8 oz)   SpO2 97%   BMI 35.08 kg/m²      PREVIOUS WEIGHTS:  Wt Readings from Last 3 Encounters: "   04/14/25 95.6 kg (210 lb 12.8 oz)   03/13/25 95.3 kg (210 lb)   02/27/25 95.3 kg (210 lb)       Physical Exam  Constitutional: Alert and oriented, cooperative, no obvious distress.   HENT: normocephalic.   Neurologic: AOx3.   psychiatric: appropriate mood and affect.  Integumentary: no significant rashes observed over pap mask area.       RESULTS/DATA     Iron (ug/dL)   Date Value   02/13/2019 89     Iron Saturation (%)   Date Value   02/13/2019 23 (L)     TIBC (ug/dL)   Date Value   02/13/2019 387               ASSESSMENT/PLAN     Ms. Coates is a 69 y.o. female and  has a past medical history of Acute upper respiratory infection, unspecified (04/08/2014), Atypical squamous cells of undetermined significance on cytologic smear of cervix (ASC-US), Body mass index (BMI) 32.0-32.9, adult (06/05/2020), Cough, unspecified (04/22/2014), Encounter for gynecological examination (general) (routine) without abnormal findings (12/03/2013), Endometrial intraepithelial neoplasia (EIN) (04/23/2019), Personal history of other diseases of the female genital tract (04/23/2019), Personal history of other diseases of the musculoskeletal system and connective tissue, Personal history of other diseases of the nervous system and sense organs (05/29/2015), Personal history of other diseases of the respiratory system (05/29/2015), Personal history of other diseases of the respiratory system (04/25/2014), Personal history of other diseases of urinary system, Personal history of other mental and behavioral disorders, Personal history of other specified conditions (05/28/2018), Personal history of other specified conditions, Personal history of other specified conditions (08/07/2013), and Urinary tract infection, site not specified (09/24/2019). She is following up on sleep apnea.     Problem List Items Addressed This Visit    None      Problem List and Orders     F with pmhx including paroxysmal A-fib, HTN.      1- GAVIOTA  symptoms include  snoring, night time awakenings, nocturia.   PSG 4/12/23 --> mild GAVIOTA, RDI 3%13.4, RDI 4% 8, OLVIN 0; SpO2 karlee 86%.      Psg 1/22/25 --> mild GAVIOTA, RDI 3% 12.1, RDI 4% 5, OLVIN 0.7. SpO2 karlee 86%.     Reviewed and discussed the above sleep study results and management options in details. All questions answered, patient verbalizes understanding.      DME was [facility] but is trying [facility] .     Cont. Autopap 5-15cwp, rAHI 1, median pressure 6.4 cmH2O, max avg 11.4, acceptable compliance, few days with a large leak.     -ordered supplies and mask refitting through [facility].     -You can use a chin strap to help with your dry mouth, you can also adjust your humidity level to your comfort. You can use a nasal spray to help you breathe better from your nose.    -do not drive or operate heavy machinery if drowsy.  -avoid sleeping on your back.   -avoid sedating substances/ medication, alcohol, illicit drugs and tobacco.     2- Obesity  counseled on eating a healthy diet and exercising as tolerated.       3- HTN  Follow up with pcp/ cardiology  Cont. Current regimen     4- paroxysmal A-fib  Follow up with cardiology   Cont. Current regimen     f/u 12 months or sooner as needed.

## 2025-05-06 NOTE — PROGRESS NOTES
Subjective   Patient ID: Luba Coates is a 69 y.o. female who presents for her 6 month follow up multiple medical conditions      She is going to [place] for 2 weeks in 5/25.    She is not approved by [facility] for CPAP. They want more information from the physician   She is frustrated with going between the supply company and her physician     She is not happy about her weight     She has had 3 episodes of a- fib. Once she was dehydrated and the other 2 times was EtOH induced       HEALTH:   PAP 2013, 5/17, TRH/BSO in 6/19 and no longer needs to repeat   - D/C 4/19 + polyp -   Mammo  6/18, 7/20, 2/22, 2/23, 8/24  BD 6/18 T-1.7, 2/23 T-2.5   --  started fosamax in 10/2023   -- Her mother has OP and is doing Prolia injections.    Colon 7/17 nl and Q 10   Ca+ cardiac score 6/18 was Zero, 10/21 Zero  EKG  7/23, 11/23, 4/24, 11/24, 3/25  cardio  Urine 5/18,  Hep C 6/18 -    Flu 10/22, 11/23   TDAP 6/5/2020  RSV at age 77   Prevnar 20 11/14/2023  Pneumovax never  Shingrix 6/18/2020 and 9/9/2020    SARS-CoV-2- 3/21,3/21, 11/21, 10/22  Ophth Her last eye exam was in 1/2022. She has early cataracts OD, no glaucoma or MD.       Review of Systems  All systems negative except those listed in the HPI        Objective   Visit Vitals  /60 (BP Location: Left arm, Patient Position: Sitting, BP Cuff Size: Adult)   Pulse 62   Temp 35.8 °C (96.4 °F) (Temporal)    Body mass index is 35.11 kg/m².     Physical Exam  Vitals reviewed.   Constitutional:       Appearance: Normal appearance.   HENT:      Head: Normocephalic.      Right Ear: Tympanic membrane, ear canal and external ear normal.      Left Ear: Tympanic membrane, ear canal and external ear normal.      Nose: Nose normal.      Mouth/Throat:      Pharynx: Oropharynx is clear.   Eyes:      Conjunctiva/sclera: Conjunctivae normal.   Cardiovascular:      Rate and Rhythm: Normal rate and regular rhythm.      Pulses: Normal pulses.      Heart sounds: Normal heart sounds.       Comments: She is not in a- fib  Pulmonary:      Effort: Pulmonary effort is normal.      Breath sounds: Normal breath sounds.   Abdominal:      General: Bowel sounds are normal.      Palpations: Abdomen is soft.   Musculoskeletal:         General: Normal range of motion.      Cervical back: Normal range of motion and neck supple.   Skin:     General: Skin is warm.   Neurological:      General: No focal deficit present.      Mental Status: She is alert and oriented to person, place, and time.   Psychiatric:         Mood and Affect: Mood normal.         Behavior: Behavior normal.         Thought Content: Thought content normal.         Judgment: Judgment normal.       Assessment/Plan   Problem List Items Addressed This Visit       Benign essential hypertension    Combined hyperlipidemia    Relevant Orders    CBC    Comprehensive Metabolic Panel    Lipid Panel    Mild diastolic dysfunction    GAVIOTA (obstructive sleep apnea)    Paroxysmal atrial fibrillation (Multi) - Primary    Pulmonary hypertension, unspecified (Multi)    Shoulder capsulitis    Trigeminal neuralgia     Other Visit Diagnoses         Routine general medical examination at health care facility          Severe obesity (BMI 35.0-35.9 with comorbidity) (Multi)                Follow up completed  Reviewed her labs form 11/24  Labs ordered       She is  with 1 daughter. She denies previous history of tobacco use  Her dad passed away in 11/18 from dementia. Her mother had a CVA   She has been taking care of her  who has Myasthenia Gravis    She also has a friend that relies on her for quite a bit.    She is going to [place] for 2 weeks in 5/25.   Her daughter is having her 3rd baby in August, it is a boy     Patient introduced to the Chronic Care Management Program.   Patient opted into services and those services will be performed under my direction.  POC will be derived from the comprehensive assessment and outlined plan of care found in  patient's last AWV/follow up appointment.  I have discussed the nature and availability of the program with the patient, the patient's responsibility for potential cost sharing, only one practitioner furnishing services during a calendar month, and the right to stop services at any time.    I would like to see if we can get coverage for Mounjaro to help with sleep apnea, BP  and cholesterol levels 5/25.   She will send me a message once she returns from [place] then I will place the orders for pharmacy 5/25. I do not want to start a GLP 1 agonist right before she goes on vacation      Her weight in office is 211 with BMI of 35.11. We spent 15 minutes discussing diet and exercise. The struggle of weight loss persists   I would like to see her BMI below 35.   Recommend she look into a plant based/ whole foods diet       A-fib diagnosed 7/20: On exam: sinus 5/25. She has had 3 episodes of a- fib. Once she was dehydrated and the other 2 times was EtOH induced   She failed Bystolic due to diarrhea   Palpitations were related to alcohol intake as well   Continue Multaq 400 mg BID and Eliquis 5 mg BID    Stress test, ECHO, and testing was normal no valve disease or clot 2/2020.   Holter monitor in 2/20 normal sinus rhythm, there was no atrial fibrillation. No significant tachycardia.   She saw Dr Buchanan in 3/25 and return in 6 months      HTN : BP Stable   Continue losartan 25 mg daily   EKG 3/25 QT/Qtc Stable    Recommend she monitor her BP at home and call with elevated readings   She saw Dr Grady in 2/25 follow-up with Dr. Buchanan next month. Explained to her that it is possible that ablative therapy would be a better strategy as she is still having breakthrough episodes of atrial fibrillation. Recommend she continue with Zetia and does not want to advocate for a PCSK9 inhibitor      I have spent 15 min face to face with this patient discussing their cardiac risk   We discussed the patients cardiovascular risk. If needed,  lifestyle modifications recommended including: behavioral therapies of nutrition choices, exercise and eliminate habits that are contributing to their cardiac risk. We agreed to a plan to decrease his cardiovascular risks. Discussed ASA. Reviewed Guidelines and approved recommendations made to patient.   The patient's 10 yr CV risk was estimated at  10.5 % IO 5/25       HLD:  and HDL 68 on labs in 11/24.  She did not qualify for Repatha with cardiology in 2/25   Explained goal for LDL to be less than 100 and ideally less than 70    Continue ezetimibe 10 mg daily   Intolerant to rosuvastatin, atorvastatin, pravastatin and simvastatin.    She failed Livalo due to cost     She did not qualify for Repatha with cardiology in 2/25    Ca cardiac score in 10/21 ZERO, unchanged 1.6 cm low density left hepatic lobe lesion may represent a cyst   Offered patient referral to dietician, she declines  Recommend she look into a plant based/ whole foods diet      BPPV: Stable   When her ears get congested she has vertigo Sx   Continue Epley maneuver prn      BLE :    Continue furosemide prn. She usually takes furosemide weekly   Recommend she begin wearing support stockings especially with travel  Elevate legs 45 minutes at night   Monitor daily sodium intake      Mild situational depression:    She moved her mother to assisted living.    Her mother's cognitive ability has decreased greatly    She has a friend that relies on her quite a bit    She takes care of her  with his health issues    Her father passed away in 11/18.    Recommend she speak with a therapist. She declines.   Explained importance of her taking breaks for herself to help her cope   She failed Lexapro due to nausea and fatigue and weaned off.  She declines medication      Derm  She saw derm in 2020 and no issues to report      Cold sore inner left lip due to increased stress and history.  She has Valtrex 1GM on hand for outbreaks   She can use OTC  Abreva.     GAVIOTA: She is not approved by [facility] for CPAP. They want more information from the physician. She is frustrated with going between the supply company and her physician. She is still working on getting coverage        Symptoms include snoring, night time awakenings, nocturia.       Psg 1/22/25 --> mild GAVIOTA, RDI 3% 12.1, RDI 4% 5, OLVIN 0.7. SpO2 karlee 86%.        She saw Dr Stiles in 4/25 (sleep med) continue CPAP 5-15cwp     Vitamin D def:  Continue OTC Vitamin D3 2000 UT daily      S/p GARCIA and BSO with Dr Killian in 6/19. Bx was precancerous.   Bladder has mild anterior prolapse and continue Kegels      Mammo was normal in 8/24  Breast exam normal 11/24      BD in 2/2023 T-2.5. Continue Fosamax 70 mg weekly.    She has been on Fosamax since 10/2023.    Her mother has OP and is doing Prolia injections.   Continue OTC Calcium 600 mg QD (she had constipation with the higher doses of calcium), OTC Vitamin D3 2000 UT daily and eat 2 servings of calcium enriched foods daily. Discussed importance of weight bearing exercises      Colonoscopy in 7/17 normal and Q 10      Ophth:  Her last eye exam was in 1/2022 and sees yearly. She has early cataracts OD, no glaucoma or MD.      She does not have a LW and MPOA.   Her daughter is going to be her primary MPOA and her  will be her secondary  Recommend she bring a copy of her LW and MPOA in office to have scanned in her chart 11/24     Hep C 6/18 -    Flu 10/22, 11/23   TDAP 6/5/2020  RSV at age 77   Prevnar 20 11/14/2023  Pneumovax never  Shingrix 6/18/2020 and 9/9/2020    SARS-CoV-2- 3/21,3/21, 11/21, 10/22     Some elements in the chart were copied from Dr. Edgar's last office visit with patient. Notes have been updated where appropriate, and reflect my current medical decision making from today.      RTC in 6 months for MCR or sooner if needed    (MCR due 11/25, last mcr 11/7/24)      Scribe Attestation  By signing my name below, I, Yanni Camp   attest  that this documentation has been prepared under the direction and in the presence of Deepika Edgar MD.

## 2025-05-07 ENCOUNTER — APPOINTMENT (OUTPATIENT)
Dept: PRIMARY CARE | Facility: CLINIC | Age: 70
End: 2025-05-07
Payer: MEDICARE

## 2025-05-07 VITALS
HEIGHT: 65 IN | WEIGHT: 211 LBS | DIASTOLIC BLOOD PRESSURE: 60 MMHG | OXYGEN SATURATION: 97 % | TEMPERATURE: 96.4 F | BODY MASS INDEX: 35.16 KG/M2 | HEART RATE: 62 BPM | SYSTOLIC BLOOD PRESSURE: 128 MMHG

## 2025-05-07 DIAGNOSIS — G50.0 TRIGEMINAL NEURALGIA: ICD-10-CM

## 2025-05-07 DIAGNOSIS — E66.01 SEVERE OBESITY (BMI 35.0-35.9 WITH COMORBIDITY) (MULTI): ICD-10-CM

## 2025-05-07 DIAGNOSIS — I48.0 PAROXYSMAL ATRIAL FIBRILLATION (MULTI): Primary | ICD-10-CM

## 2025-05-07 DIAGNOSIS — I27.20 PULMONARY HYPERTENSION, UNSPECIFIED (MULTI): ICD-10-CM

## 2025-05-07 DIAGNOSIS — I51.9 MILD DIASTOLIC DYSFUNCTION: ICD-10-CM

## 2025-05-07 DIAGNOSIS — M77.8 SHOULDER CAPSULITIS: ICD-10-CM

## 2025-05-07 DIAGNOSIS — I10 BENIGN ESSENTIAL HYPERTENSION: ICD-10-CM

## 2025-05-07 DIAGNOSIS — G47.33 OSA (OBSTRUCTIVE SLEEP APNEA): ICD-10-CM

## 2025-05-07 DIAGNOSIS — E78.2 COMBINED HYPERLIPIDEMIA: ICD-10-CM

## 2025-05-07 DIAGNOSIS — Z00.00 ROUTINE GENERAL MEDICAL EXAMINATION AT HEALTH CARE FACILITY: ICD-10-CM

## 2025-05-07 PROCEDURE — 99214 OFFICE O/P EST MOD 30 MIN: CPT | Performed by: INTERNAL MEDICINE

## 2025-05-07 PROCEDURE — 1036F TOBACCO NON-USER: CPT | Performed by: INTERNAL MEDICINE

## 2025-05-07 PROCEDURE — 1159F MED LIST DOCD IN RCRD: CPT | Performed by: INTERNAL MEDICINE

## 2025-05-07 PROCEDURE — 3074F SYST BP LT 130 MM HG: CPT | Performed by: INTERNAL MEDICINE

## 2025-05-07 PROCEDURE — 3078F DIAST BP <80 MM HG: CPT | Performed by: INTERNAL MEDICINE

## 2025-05-07 PROCEDURE — 3008F BODY MASS INDEX DOCD: CPT | Performed by: INTERNAL MEDICINE

## 2025-05-07 PROCEDURE — G2211 COMPLEX E/M VISIT ADD ON: HCPCS | Performed by: INTERNAL MEDICINE

## 2025-05-07 PROCEDURE — 1160F RVW MEDS BY RX/DR IN RCRD: CPT | Performed by: INTERNAL MEDICINE

## 2025-06-04 LAB
ALBUMIN SERPL-MCNC: 4.2 G/DL (ref 3.6–5.1)
ALP SERPL-CCNC: 92 U/L (ref 37–153)
ALT SERPL-CCNC: 23 U/L (ref 6–29)
ANION GAP SERPL CALCULATED.4IONS-SCNC: 10 MMOL/L (CALC) (ref 7–17)
AST SERPL-CCNC: 24 U/L (ref 10–35)
BILIRUB SERPL-MCNC: 0.4 MG/DL (ref 0.2–1.2)
BUN SERPL-MCNC: 11 MG/DL (ref 7–25)
CALCIUM SERPL-MCNC: 8.8 MG/DL (ref 8.6–10.4)
CHLORIDE SERPL-SCNC: 106 MMOL/L (ref 98–110)
CHOLEST SERPL-MCNC: 245 MG/DL
CHOLEST/HDLC SERPL: 3.7 (CALC)
CO2 SERPL-SCNC: 25 MMOL/L (ref 20–32)
CREAT SERPL-MCNC: 0.79 MG/DL (ref 0.5–1.05)
EGFRCR SERPLBLD CKD-EPI 2021: 82 ML/MIN/1.73M2
ERYTHROCYTE [DISTWIDTH] IN BLOOD BY AUTOMATED COUNT: 13.8 % (ref 11–15)
GLUCOSE SERPL-MCNC: 98 MG/DL (ref 65–99)
HCT VFR BLD AUTO: 44.9 % (ref 35–45)
HDLC SERPL-MCNC: 67 MG/DL
HGB BLD-MCNC: 14.2 G/DL (ref 11.7–15.5)
LDLC SERPL CALC-MCNC: 148 MG/DL (CALC)
MCH RBC QN AUTO: 28 PG (ref 27–33)
MCHC RBC AUTO-ENTMCNC: 31.6 G/DL (ref 32–36)
MCV RBC AUTO: 88.4 FL (ref 80–100)
NONHDLC SERPL-MCNC: 178 MG/DL (CALC)
PLATELET # BLD AUTO: 307 THOUSAND/UL (ref 140–400)
PMV BLD REES-ECKER: 9.7 FL (ref 7.5–12.5)
POTASSIUM SERPL-SCNC: 4.5 MMOL/L (ref 3.5–5.3)
PROT SERPL-MCNC: 6.9 G/DL (ref 6.1–8.1)
RBC # BLD AUTO: 5.08 MILLION/UL (ref 3.8–5.1)
SODIUM SERPL-SCNC: 141 MMOL/L (ref 135–146)
TRIGL SERPL-MCNC: 161 MG/DL
WBC # BLD AUTO: 5.5 THOUSAND/UL (ref 3.8–10.8)

## 2025-06-05 DIAGNOSIS — G47.33 OSA (OBSTRUCTIVE SLEEP APNEA): ICD-10-CM

## 2025-06-05 DIAGNOSIS — E66.01 SEVERE OBESITY (BMI 35.0-35.9 WITH COMORBIDITY) (MULTI): ICD-10-CM

## 2025-06-05 DIAGNOSIS — I10 BENIGN ESSENTIAL HYPERTENSION: Primary | ICD-10-CM

## 2025-06-10 PROCEDURE — RXMED WILLOW AMBULATORY MEDICATION CHARGE

## 2025-06-11 ENCOUNTER — PHARMACY VISIT (OUTPATIENT)
Dept: PHARMACY | Facility: CLINIC | Age: 70
End: 2025-06-11
Payer: MEDICARE

## 2025-06-26 ENCOUNTER — APPOINTMENT (OUTPATIENT)
Dept: PHARMACY | Facility: HOSPITAL | Age: 70
End: 2025-06-26
Payer: MEDICARE

## 2025-06-26 DIAGNOSIS — G47.33 OSA (OBSTRUCTIVE SLEEP APNEA): ICD-10-CM

## 2025-06-26 DIAGNOSIS — I10 BENIGN ESSENTIAL HYPERTENSION: ICD-10-CM

## 2025-06-26 DIAGNOSIS — E66.01 SEVERE OBESITY (BMI 35.0-35.9 WITH COMORBIDITY) (MULTI): ICD-10-CM

## 2025-06-26 NOTE — PROGRESS NOTES
"  Clinical Pharmacy Appointment    Patient ID: Luba Coates is a 69 y.o. female who presents for Obesity.    Pt is here for First appointment.     Referring Provider: Deepika Edgar MD  PCP: Deepika Edgar MD  Last visit with PCP: 5/7/2025   Next visit with PCP: 11/10/2025    Subjective     Interval History      HPI  Luba Coates is a 69 y.o. year old female patient with class II obesity at baseline, obstructive sleep apnea, hyperlipidemia, hypertension referred to pharmacy clinic for titration of GLP1 therapy in the setting of obesity and GAVIOTA. She was approved by her prescription insurance for coverage of tirzepatide (Zepbound). Today we are discussing her initial response to therapy along with potential titration.       WEIGHT LOSS  BMI Readings from Last 3 Encounters:   05/07/25 35.11 kg/m²   04/14/25 35.08 kg/m²   03/13/25 34.95 kg/m²      Referring weight: 211 lbs (5/7/2025)  Starting weight: 206 lbs. (6/11/2025)  Current weight: 201 lbs (6/26/2025)    Weight Goals  Next goal: Weight -5% (10 lbs.)  Maintenance BMI Goal: 23 to 29.9 reasonable due to age    Lifestyle  Diet: 2-3 meals/day.   BK: Hardboiled egg (1)  Snacks: Handful of blueberries, handful of unsalted almonds  Drinks: Water ( fl ounces), protein shake, 1 cup of caffeinated coffee + 1 cup of decaffeinated coffee   No consumption   Generally minimized consumption of \"bad carbohydrates\"   I.e. cookies, chips, bread  Diet Recall:   Meal 1: Protein shake   Meal 2: Barbecue spare ribs (2)  Snacks: Handful of blueberries  Fluids: Water   Yesterday had 1 hardboiled egg and 1 hamburger to eat throughout the day  Estimates since starting Zepbound 2.5 mg that she is eating 25% of what she was previously eating, estimates 800-1100 calories per day  She had a day where she made hamburgers and homemade french fries (airfryer) and had to force herself to finish it. When the meal was completed she felt some stomach discomfort.   She is trying to " be conscious about her protein intake   Has worked with nutritionist/dietician? No  Has worked with a functional medicine diet  Exercise:   Walking - 3,000 - 10,000 steps  No other form of regular physical activity   Recalled when going to [place] she was able to walk 17,000-18,000, but noted with elevation changes she would start to get short of breath  Is limited by: SEXTON if overexerting     Other Potential Contributing Factors  Alcohol use: Average 1-4 (standard drinks - 1 to 1.5 hours between drinks); Variable frequency: 2-3 x/mo up to 2 months without alcohol consumption  Tobacco use: No  Other drug use: No  Medications that may cause weight gain: none  Mental health: No current concerns  Eating Disorders: Denies Hx of any eating disorder  Comorbidities: Hyperlipidemia , Hypertension, and Sleep Apnea    Pertinent PMH Review:  PMH of Pancreatitis: No  PMH of Retinopathy: No  PMH of MTC: No  PMH of MEN2: No    Non-Pharmacological Therapy  Weight loss techniques attempted:  Weight watchers    Pharmacological Therapy  Current Medications: Zepbound 2.5 mg: Inject 2.5 mg under the skin once weekly on    Clarifications to above regimen: None  Doses completed: 3   Doses remainin   Adverse Effects: Upset stomach, belching, constipation (less frequency)  Administration location: stomach/abdomen   Previous Medications: None     Insurance coverage of weight-loss medications? Yes - Prior authorization   Eligible for copay cards/programs? No; Medicare enrolled    Medication Estimated Cost  Without Insurance Expected weight loss Patient Risks/Cautions Notes   Wegovy (semaglutide) $499/month   w/ savings card 10-20% No known concerns      Zepbound (tirzepatide) $499/month vials via [employer].  $650/month pens w/ savings card. 15-25%     Saxenda (liraglutide) ~$1300/month 5-15%       Medication Reconciliation:  Changed: None  Added: None  Discontinued: None    Drug Interactions  Dronedarone + Apixaban:  "Concurrent use of an agent that is both an inhibitor of P-gp and a moderate inhibitor of CY may result in elevated levels of and clinical effects of apixaban, including an increased risk of bleeding.    Medication System Management  Patient's preferred pharmacy: [facility]  Adherence/Organization: No concerns with medication adherence  Affordability/Accessibility:     Medicare  - Out of pocket maximum reached  (catastrophic coverage)    Objective   Allergies[1]  Social History     Social History Narrative    Not on file      Medication Review  Current Outpatient Medications   Medication Instructions    alendronate (Fosamax) 70 mg tablet TAKE 1 TABLET EVERY 7 DAYS IN THE MORNING WITH A FULL GLASS OF WATER, ON AN EMPTY STOMACH, AND DO NOT TAKE ANYTHING ELSE BY MOUTH OR LIE DOWN FOR THE NEXT 30 MINUTES.    apixaban (ELIQUIS) 5 mg, oral, 2 times daily    cholecalciferol (VITAMIN D3) 1,000 Units, Daily    ezetimibe (ZETIA) 10 mg, oral, Daily    furosemide (LASIX) 20 mg, Daily PRN    loratadine (CLARITIN) 10 mg, Daily    losartan (COZAAR) 25 mg, oral, Daily    magnesium glycinate 200 mg, Daily    Multaq 400 mg, oral, 2 times daily (morning and late afternoon)    tirzepatide (weight loss) (ZEPBOUND) 2.5 mg, subcutaneous, Every 7 days      Vitals  BP Readings from Last 6 Encounters:   25 128/60   25 136/81   25 134/80   25 122/82   25 148/77   24 130/66     BMI Readings from Last 6 Encounters:   25 35.11 kg/m²   25 35.08 kg/m²   25 34.95 kg/m²   25 34.95 kg/m²   25 34.20 kg/m²   25 34.15 kg/m²      Labs  A1C  No results found for: \"HGBA1C\"    BMP  Lab Results   Component Value Date    CALCIUM 8.8 2025     2025    K 4.5 2025    CO2 25 2025     2025    BUN 11 2025    CREATININE 0.79 2025    EGFR 82 2025     LFTs  Lab Results   Component Value Date    ALT 2025    " "AST 24 06/04/2025    ALKPHOS 92 06/04/2025    BILITOT 0.4 06/04/2025     FLP  Lab Results   Component Value Date    TRIG 161 (H) 06/04/2025    CHOL 245 (H) 06/04/2025    LDLF 151 (H) 05/18/2023    LDLCALC 148 (H) 06/04/2025    HDL 67 06/04/2025     Urine Microalbumin  No results found for: \"MICROALBCREA\"    Weight Management  Wt Readings from Last 3 Encounters:   05/07/25 95.7 kg (211 lb)   04/14/25 95.6 kg (210 lb 12.8 oz)   03/13/25 95.3 kg (210 lb)      There is no height or weight on file to calculate BMI.     Assessment/Plan   Problem List Items Addressed This Visit       Benign essential hypertension    Relevant Medications    tirzepatide, weight loss, (Zepbound) 2.5 mg/0.5 mL injection    Other Relevant Orders    Referral to Clinical Pharmacy    GAVIOTA (obstructive sleep apnea)    Current regimen includes Zepbound 2.5 mg once weekly. Her last office weight was 211 lbs on 5/7/2025. Her baseline body weight prior to therapy was 206 lbs and reports a current weight of 201 lbs on 6/26/2025. She has lost 5 lb from baseline (2.4% of TBW) since starting therapy plan. We discussed continuing therapy with the lowest dose of Zepbound 2.5 mg at this time as she estimates she is eating 25% of her baseline diet. She is tracking her calories in on a daily basis. She does not participate in regular physical activity and estimates she may walk anywhere between 3,000 and 10,000 steps per day. Follow up in 1 month. Prior authorization approved from 3/12/2025 to 6/10/2026.    Medication Changes:  CONTINUE  Zepbound 2.5 mg once weekly    Future Considerations:  Titration of therapy to the maximum effective dose to target a 10-20% baseline body weight loss    Monitoring and Education:  Counseled patient on relevant medication mechanisms of action, expectations, side effects, duration of therapy, contraindications, administration, and monitoring parameters.  All questions and concerns addressed. Contact pharmacist with any further " questions or concerns prior to next appointment.    Zepbound Education:   Counseled patient on Zepbound MOA, expectations, side effects, duration of therapy, administration, and monitoring parameters.  Provided detailed dosing and administration counseling to ensure proper technique.   Reviewed Zepbound titration schedule, starting with 2.5 mg once weekly to a goal of 15 mg once weekly if tolerated  Counseled patient on the benefits of GLP-1ra glycemic control and weight loss  Reviewed storage requirements of Zepbound when not in use, and when to administer the medication if a dose is missed.  Advised patient that they may experience improved satiety after meals and portion sizes of meals may be reduced as doses of Zepound increase.         Relevant Medications    tirzepatide, weight loss, (Zepbound) 2.5 mg/0.5 mL injection    Other Relevant Orders    Referral to Clinical Pharmacy     Other Visit Diagnoses         Severe obesity (BMI 35.0-35.9 with comorbidity) (Multi)        Relevant Medications    tirzepatide, weight loss, (Zepbound) 2.5 mg/0.5 mL injection    Other Relevant Orders    Referral to Clinical Pharmacy          Clinical Pharmacist follow-up: 1 month, Telehealth visit    Continue all meds under the continuation of care with the referring provider and clinical pharmacy team.    Thank you,  Prudencio Reyes, PharmD    Clinical Pharmacist  [phone]    Verbal consent to manage patient's drug therapy was obtained from the patient. They were informed they may decline to participate or withdraw from participation in pharmacy services at any time.         [1]   Allergies  Allergen Reactions    Nebivolol Other     Leg cramps    Statins-Hmg-Coa Reductase Inhibitors Myalgia    Tramadol Anxiety, Psychosis and Nausea/vomiting     hallucinations    Escitalopram Oxalate Nausea/vomiting

## 2025-06-26 NOTE — PATIENT INSTRUCTIONS
Eating tips to reduce nausea and unwanted GI side effects   Eat slowly  Eat smaller portions  Avoid laying down right after meal  Stop eating when feeling full  Avoid drinking from straw  Stay hydrated, drink small amounts of water throughout the day  Avoid strenuous exercise after eating  Avoid eating close too bedtime     Meal planning tips  Avoid fried foods, and high fat meals  Focus on bland foods  Choose water rich foods like soups, kefir, protein drinks/smoothies      Lifestyle suggestions  Keep a food/symptom diary, as it may be helpful to identify meal timing of foods that make nausea worse.  Engage in light exercise (walking), get fresh air     Additional tips for alleviating nausea:  Wait at least 30 minutes after GLP1-RA dose to eat, if feeling nauseated try crackers, apples, mint, marin root or marin tea  Avoid strong smells     If vomiting:  Eat smaller amounts of food in more frequent meals  Stay hydrated, but avoid drinks during meals, wait 30-60 minutes before and after meals to have liquids     If having diarrhea:  Generous hydration, i.e: water with lemon and tsp baking soda  Avoid dairy products, laxatives, coffee, alcoholic beverages, soft drinks, very cold or very hot foods, products that contain sugar alcohols (sorbitol, mannitol, xylitol, maltitol), including gum and candy  Avoid (or temporarily reduce your intake of) foods with high fiber content such as grain and seed products, whole grain breads, artichokes, asparagus, beans, cabbage, lentils, mushrooms, onions, garlic, sugar snap peas, skinned fruits, apples, apricots, blackberries, cherries, eldon, nectarine, pears, plum  Eat chicken broth, rice, carrots, very ripe fruit without skin  Gradually increase fiber back in when symptoms improve     If having constipation:  Ensure the amount of fiber in diet is adequate (goal is minimum of 25 grams per day).  Increase physical activity  Drink generous amounts of water

## 2025-06-27 NOTE — ASSESSMENT & PLAN NOTE
Current regimen includes Zepbound 2.5 mg once weekly. Her last office weight was 211 lbs on 5/7/2025. Her baseline body weight prior to therapy was 206 lbs and reports a current weight of 201 lbs on 6/26/2025. She has lost 5 lb from baseline (2.4% of TBW) since starting therapy plan. We discussed continuing therapy with the lowest dose of Zepbound 2.5 mg at this time as she estimates she is eating 25% of her baseline diet. She is tracking her calories in on a daily basis. She does not participate in regular physical activity and estimates she may walk anywhere between 3,000 and 10,000 steps per day. Follow up in 1 month. Prior authorization approved from 3/12/2025 to 6/10/2026.    Medication Changes:  CONTINUE  Zepbound 2.5 mg once weekly    Future Considerations:  Titration of therapy to the maximum effective dose to target a 10-20% baseline body weight loss    Monitoring and Education:  Counseled patient on relevant medication mechanisms of action, expectations, side effects, duration of therapy, contraindications, administration, and monitoring parameters.  All questions and concerns addressed. Contact pharmacist with any further questions or concerns prior to next appointment.    Zepbound Education:   Counseled patient on Zepbound MOA, expectations, side effects, duration of therapy, administration, and monitoring parameters.  Provided detailed dosing and administration counseling to ensure proper technique.   Reviewed Zepbound titration schedule, starting with 2.5 mg once weekly to a goal of 15 mg once weekly if tolerated  Counseled patient on the benefits of GLP-1ra glycemic control and weight loss  Reviewed storage requirements of Zepbound when not in use, and when to administer the medication if a dose is missed.  Advised patient that they may experience improved satiety after meals and portion sizes of meals may be reduced as doses of Zepound increase.

## 2025-07-01 PROCEDURE — RXMED WILLOW AMBULATORY MEDICATION CHARGE

## 2025-07-02 ENCOUNTER — PHARMACY VISIT (OUTPATIENT)
Dept: PHARMACY | Facility: CLINIC | Age: 70
End: 2025-07-02
Payer: MEDICARE

## 2025-07-24 ENCOUNTER — APPOINTMENT (OUTPATIENT)
Dept: PHARMACY | Facility: HOSPITAL | Age: 70
End: 2025-07-24
Payer: MEDICARE

## 2025-07-24 DIAGNOSIS — E66.811 CLASS 1 OBESITY DUE TO EXCESS CALORIES WITH SERIOUS COMORBIDITY AND BODY MASS INDEX (BMI) OF 34.0 TO 34.9 IN ADULT: ICD-10-CM

## 2025-07-24 DIAGNOSIS — G47.33 OSA (OBSTRUCTIVE SLEEP APNEA): ICD-10-CM

## 2025-07-24 DIAGNOSIS — E66.09 CLASS 1 OBESITY DUE TO EXCESS CALORIES WITH SERIOUS COMORBIDITY AND BODY MASS INDEX (BMI) OF 34.0 TO 34.9 IN ADULT: ICD-10-CM

## 2025-07-24 DIAGNOSIS — I10 BENIGN ESSENTIAL HYPERTENSION: ICD-10-CM

## 2025-07-24 DIAGNOSIS — E66.01 SEVERE OBESITY (BMI 35.0-35.9 WITH COMORBIDITY) (MULTI): Primary | ICD-10-CM

## 2025-07-24 NOTE — PROGRESS NOTES
"  Clinical Pharmacy Appointment    Patient ID: Luba Coates is a 69 y.o. female who presents for Weight Loss.    Pt is here for Follow Up appointment.     Referring Provider: Deepika Edgar MD  PCP: Deepika Edgar MD  Last visit with PCP: 5/7/2025   Next visit with PCP: 11/10/2025    Subjective     HPI  Luba Coates is a 69 y.o. year old female patient with class II obesity at baseline, obstructive sleep apnea, hyperlipidemia, hypertension referred to pharmacy clinic for titration of GLP1 therapy in the setting of obesity and GAVIOTA. She was approved by her prescription insurance for coverage of tirzepatide (Zepbound). She is presenting today for a weight loss management visit.      WEIGHT LOSS  BMI Readings from Last 3 Encounters:   05/07/25 35.11 kg/m²   04/14/25 35.08 kg/m²   03/13/25 34.95 kg/m²      Referring weight: 211 lbs (5/7/2025)  Starting weight: 206 lbs. (6/11/2025)  Previous weight: 201 lbs (6/26/2025)  Current weight: 197.6 lbs (7/24/2025)     Weight Goals  Next goal: Weight -5% (10 lbs.)  Maintenance BMI Goal: 23 to 29.9 reasonable due to age    Lifestyle  Diet: 2-3 meals/day.   BK: Hardboiled egg (1)  Snacks: Handful of blueberries, handful of unsalted almonds  Drinks: Water ( fl ounces), protein shake, 1 cup of caffeinated coffee + 1 cup of decaffeinated coffee   No consumption   Generally minimized consumption of \"bad carbohydrates\"   I.e. cookies, chips, bread  Diet Recall:   Meal 1: Protein shake   Meal 2: Barbecue spare ribs (2)  Snacks: Handful of blueberries  Fluids: Water   Yesterday had 1 hardboiled egg and 1 hamburger to eat throughout the day  Estimates since starting Zepbound 2.5 mg that she is eating 25% of what she was previously eating, estimates 800-1100 calories per day  She had a day where she made hamburgers and homemade french fries (airfryer) and had to force herself to finish it. When the meal was completed she felt some stomach discomfort.   She is trying to be " conscious about her protein intake   25: reports she is getting at least 60g of protein daily now and is consuming more calories than at her previous visit.   Has worked with nutritionist/dietician? No  Has worked with a functional medicine diet  Exercise:   Walking - 3,000 - 10,000 steps  No other form of regular physical activity   Recalled when going to [place] she was able to walk 17,000-18,000, but noted with elevation changes she would start to get short of breath  Is limited by: SEXTON if overexerting     Other Potential Contributing Factors  Alcohol use: Average 1-4 (standard drinks - 1 to 1.5 hours between drinks); Variable frequency: 2-3 x/mo up to 2 months without alcohol consumption  Tobacco use: No  Other drug use: No  Medications that may cause weight gain: none  Mental health: No current concerns  Eating Disorders: Denies Hx of any eating disorder  Comorbidities: Hyperlipidemia , Hypertension, and Sleep Apnea    Pertinent PMH Review:  PMH of Pancreatitis: No  PMH of Retinopathy: No  PMH of MTC: No  PMH of MEN2: No    Non-Pharmacological Therapy  Weight loss techniques attempted:  Weight watchers    Pharmacological Therapy  Current Medications: Zepbound 2.5 mg: Inject 2.5 mg under the skin once weekly on Wednesday  Clarifications to above regimen: None  Doses completed: 7  Doses remainin   Adverse Effects: belching, constipation (less frequency)  Administration location: stomach/abdomen   Previous Medications: None     Insurance coverage of weight-loss medications? Yes - Prior authorization   Eligible for copay cards/programs? No; Medicare enrolled    Medication Estimated Cost  Without Insurance Expected weight loss Patient Risks/Cautions Notes   Wegovy (semaglutide) $499/month   w/ savings card 10-20% No known concerns      Zepbound (tirzepatide) $499/month vials via [facility] [employer].  $650/month pens w/ savings card. 15-25%     Saxenda (liraglutide) ~$1300/month 5-15%       Medication  "Reconciliation:  Changed: None  Added: None  Discontinued: None    Drug Interactions  Dronedarone + Apixaban: Concurrent use of an agent that is both an inhibitor of P-gp and a moderate inhibitor of CY may result in elevated levels of and clinical effects of apixaban, including an increased risk of bleeding.    Medication System Management  Patient's preferred pharmacy: [facility] / [facility]  Adherence/Organization: No concerns with medication adherence  Affordability/Accessibility:     Medicare  - Out of pocket maximum reached  (catastrophic coverage)    Objective   Allergies[1]  Social History     Social History Narrative    Not on file      Medication Review  Current Outpatient Medications   Medication Instructions    alendronate (Fosamax) 70 mg tablet TAKE 1 TABLET EVERY 7 DAYS IN THE MORNING WITH A FULL GLASS OF WATER, ON AN EMPTY STOMACH, AND DO NOT TAKE ANYTHING ELSE BY MOUTH OR LIE DOWN FOR THE NEXT 30 MINUTES.    apixaban (ELIQUIS) 5 mg, oral, 2 times daily    cholecalciferol (VITAMIN D3) 1,000 Units, Daily    ezetimibe (ZETIA) 10 mg, oral, Daily    furosemide (LASIX) 20 mg, Daily PRN    loratadine (CLARITIN) 10 mg, Daily    losartan (COZAAR) 25 mg, oral, Daily    magnesium glycinate 200 mg, Daily    Multaq 400 mg, oral, 2 times daily (morning and late afternoon)    Zepbound 2.5 mg, subcutaneous, Every 7 days      Vitals  BP Readings from Last 6 Encounters:   25 128/60   25 136/81   25 134/80   25 122/82   25 148/77   24 130/66     BMI Readings from Last 6 Encounters:   25 35.11 kg/m²   25 35.08 kg/m²   25 34.95 kg/m²   25 34.95 kg/m²   25 34.20 kg/m²   25 34.15 kg/m²      Labs  A1C  No results found for: \"HGBA1C\"    [facility]  Lab Results   Component Value Date    CALCIUM 8.8 2025     2025    K 4.5 2025    CO2 25 2025     2025    BUN 11 2025    CREATININE 0.79 2025    EGFR 82 " "06/04/2025     LFTs  Lab Results   Component Value Date    ALT 23 06/04/2025    AST 24 06/04/2025    ALKPHOS 92 06/04/2025    BILITOT 0.4 06/04/2025     FLP  Lab Results   Component Value Date    TRIG 161 (H) 06/04/2025    CHOL 245 (H) 06/04/2025    LDLF 151 (H) 05/18/2023    LDLCALC 148 (H) 06/04/2025    HDL 67 06/04/2025     Urine Microalbumin  No results found for: \"MICROALBCREA\"    Weight Management  Wt Readings from Last 3 Encounters:   05/07/25 95.7 kg (211 lb)   04/14/25 95.6 kg (210 lb 12.8 oz)   03/13/25 95.3 kg (210 lb)      There is no height or weight on file to calculate BMI.     Assessment/Plan   Problem List Items Addressed This Visit       Benign essential hypertension    Relevant Orders    Referral to Clinical Pharmacy    GAVIOTA (obstructive sleep apnea)    Relevant Orders    Referral to Clinical Pharmacy    Class 1 obesity with body mass index (BMI) of 34.0 to 34.9 in adult    Patient is tolerating Zepbound therapy much better than at her last visit with pharmacy. She has completed 7 doses of Zepbound 2.5mg at this time. She reports mild constipation and belching with Zepbound use.     At her previous visit, she reported significant appetite suppression. She reports that she is now consuming more calories and is aiming for 60g of protein daily. She reports continued exercise with walking. She is currently 197.6 lbs, down 4.3 lbs from her last visit. Discussed continuing on Zepbound 2.5mg at this time due to continued weight loss. No refills needed at this time.     Plan:   CONTINUE:  Zepbound 2.5mg: once weekly          Other Visit Diagnoses         Severe obesity (BMI 35.0-35.9 with comorbidity) (Multi)    -  Primary    Relevant Orders    Referral to Clinical Pharmacy            Clinical Pharmacist follow-up: 1 month, Telehealth visit    Continue all meds under the continuation of care with the referring provider and clinical pharmacy team.    Thank you,  Trista Espinoza, PharmD    Clinical " Pharmacist  [phone]    Verbal consent to manage patient's drug therapy was obtained from the patient. They were informed they may decline to participate or withdraw from participation in pharmacy services at any time.           [1]   Allergies  Allergen Reactions    Nebivolol Other     Leg cramps    Statins-Hmg-Coa Reductase Inhibitors Myalgia    Tramadol Anxiety, Psychosis and Nausea/vomiting     hallucinations    Escitalopram Oxalate Nausea/vomiting

## 2025-07-24 NOTE — ASSESSMENT & PLAN NOTE
Patient is tolerating Zepbound therapy much better than at her last visit with pharmacy. She has completed 7 doses of Zepbound 2.5mg at this time. She reports mild constipation and belching with Zepbound use.     At her previous visit, she reported significant appetite suppression. She reports that she is now consuming more calories and is aiming for 60g of protein daily. She reports continued exercise with walking. She is currently 197.6 lbs, down 4.3 lbs from her last visit. Discussed continuing on Zepbound 2.5mg at this time due to continued weight loss. No refills needed at this time.     Plan:   CONTINUE:  Zepbound 2.5mg: once weekly

## 2025-07-27 DIAGNOSIS — I48.0 PAROXYSMAL ATRIAL FIBRILLATION (MULTI): Primary | ICD-10-CM

## 2025-07-31 PROCEDURE — RXMED WILLOW AMBULATORY MEDICATION CHARGE

## 2025-08-01 ENCOUNTER — PHARMACY VISIT (OUTPATIENT)
Dept: PHARMACY | Facility: CLINIC | Age: 70
End: 2025-08-01
Payer: MEDICARE

## 2025-08-07 ENCOUNTER — PATIENT MESSAGE (OUTPATIENT)
Dept: CARDIOLOGY | Facility: CLINIC | Age: 70
End: 2025-08-07
Payer: MEDICARE

## 2025-08-21 ENCOUNTER — APPOINTMENT (OUTPATIENT)
Dept: PHARMACY | Facility: HOSPITAL | Age: 70
End: 2025-08-21
Payer: MEDICARE

## 2025-08-21 DIAGNOSIS — G47.33 OSA (OBSTRUCTIVE SLEEP APNEA): ICD-10-CM

## 2025-08-21 DIAGNOSIS — E66.09 CLASS 1 OBESITY DUE TO EXCESS CALORIES WITH SERIOUS COMORBIDITY AND BODY MASS INDEX (BMI) OF 34.0 TO 34.9 IN ADULT: Primary | ICD-10-CM

## 2025-08-21 DIAGNOSIS — I10 BENIGN ESSENTIAL HYPERTENSION: ICD-10-CM

## 2025-08-21 DIAGNOSIS — E66.811 CLASS 1 OBESITY DUE TO EXCESS CALORIES WITH SERIOUS COMORBIDITY AND BODY MASS INDEX (BMI) OF 34.0 TO 34.9 IN ADULT: Primary | ICD-10-CM

## 2025-08-21 DIAGNOSIS — E66.01 SEVERE OBESITY (BMI 35.0-35.9 WITH COMORBIDITY) (MULTI): ICD-10-CM

## 2025-08-25 PROCEDURE — RXMED WILLOW AMBULATORY MEDICATION CHARGE

## 2025-08-26 ENCOUNTER — PHARMACY VISIT (OUTPATIENT)
Dept: PHARMACY | Facility: CLINIC | Age: 70
End: 2025-08-26
Payer: MEDICARE

## 2025-09-07 DIAGNOSIS — E78.2 COMBINED HYPERLIPIDEMIA: ICD-10-CM

## 2025-09-07 RX ORDER — EZETIMIBE 10 MG/1
10 TABLET ORAL DAILY
Qty: 90 TABLET | Refills: 3 | Status: SHIPPED | OUTPATIENT
Start: 2025-09-07

## 2025-09-25 ENCOUNTER — APPOINTMENT (OUTPATIENT)
Dept: CARDIOLOGY | Facility: CLINIC | Age: 70
End: 2025-09-25
Payer: MEDICARE

## 2025-10-01 ENCOUNTER — APPOINTMENT (OUTPATIENT)
Dept: PHARMACY | Facility: HOSPITAL | Age: 70
End: 2025-10-01
Payer: MEDICARE

## 2025-10-30 ENCOUNTER — APPOINTMENT (OUTPATIENT)
Dept: CARDIOLOGY | Facility: CLINIC | Age: 70
End: 2025-10-30
Payer: MEDICARE

## 2025-11-10 ENCOUNTER — APPOINTMENT (OUTPATIENT)
Dept: PRIMARY CARE | Facility: CLINIC | Age: 70
End: 2025-11-10
Payer: MEDICARE

## 2025-11-13 ENCOUNTER — APPOINTMENT (OUTPATIENT)
Dept: CARDIOLOGY | Facility: CLINIC | Age: 70
End: 2025-11-13
Payer: MEDICARE

## 2026-04-16 ENCOUNTER — APPOINTMENT (OUTPATIENT)
Facility: CLINIC | Age: 71
End: 2026-04-16
Payer: MEDICARE